# Patient Record
Sex: FEMALE | Race: ASIAN | NOT HISPANIC OR LATINO
[De-identification: names, ages, dates, MRNs, and addresses within clinical notes are randomized per-mention and may not be internally consistent; named-entity substitution may affect disease eponyms.]

---

## 2021-03-05 PROBLEM — Z00.00 ENCOUNTER FOR PREVENTIVE HEALTH EXAMINATION: Status: ACTIVE | Noted: 2021-03-05

## 2021-03-11 ENCOUNTER — APPOINTMENT (OUTPATIENT)
Dept: THORACIC SURGERY | Facility: CLINIC | Age: 66
End: 2021-03-11
Payer: MEDICARE

## 2021-03-11 VITALS
HEIGHT: 60.2 IN | HEART RATE: 95 BPM | DIASTOLIC BLOOD PRESSURE: 84 MMHG | RESPIRATION RATE: 16 BRPM | OXYGEN SATURATION: 98 % | BODY MASS INDEX: 30.42 KG/M2 | TEMPERATURE: 98 F | WEIGHT: 157 LBS | SYSTOLIC BLOOD PRESSURE: 124 MMHG

## 2021-03-11 DIAGNOSIS — Z85.3 PERSONAL HISTORY OF MALIGNANT NEOPLASM OF BREAST: ICD-10-CM

## 2021-03-11 DIAGNOSIS — Z80.0 FAMILY HISTORY OF MALIGNANT NEOPLASM OF DIGESTIVE ORGANS: ICD-10-CM

## 2021-03-11 DIAGNOSIS — Z86.79 PERSONAL HISTORY OF OTHER DISEASES OF THE CIRCULATORY SYSTEM: ICD-10-CM

## 2021-03-11 DIAGNOSIS — Z86.39 PERSONAL HISTORY OF OTHER ENDOCRINE, NUTRITIONAL AND METABOLIC DISEASE: ICD-10-CM

## 2021-03-11 DIAGNOSIS — Z85.41 PERSONAL HISTORY OF MALIGNANT NEOPLASM OF CERVIX UTERI: ICD-10-CM

## 2021-03-11 PROCEDURE — 99205 OFFICE O/P NEW HI 60 MIN: CPT

## 2021-03-12 DIAGNOSIS — Z01.818 ENCOUNTER FOR OTHER PREPROCEDURAL EXAMINATION: ICD-10-CM

## 2021-03-12 PROBLEM — Z85.41 HISTORY OF CERVICAL CARCINOMA: Status: RESOLVED | Noted: 2021-03-12 | Resolved: 2021-03-12

## 2021-03-12 PROBLEM — Z80.0 FAMILY HISTORY OF MALIGNANT NEOPLASM OF STOMACH: Status: ACTIVE | Noted: 2021-03-12

## 2021-03-12 PROBLEM — Z85.3 HISTORY OF MALIGNANT NEOPLASM OF RIGHT BREAST: Status: RESOLVED | Noted: 2021-03-12 | Resolved: 2021-03-12

## 2021-03-12 PROBLEM — Z86.79 HISTORY OF HYPERTENSION: Status: RESOLVED | Noted: 2021-03-12 | Resolved: 2021-03-12

## 2021-03-12 PROBLEM — Z86.39 HISTORY OF DIABETES MELLITUS: Status: RESOLVED | Noted: 2021-03-12 | Resolved: 2021-03-12

## 2021-03-12 RX ORDER — LACTOBACILLUS ACIDOPHILUS/PECT 30 MG-20MG
TABLET ORAL
Refills: 0 | Status: ACTIVE | COMMUNITY

## 2021-03-12 RX ORDER — SITAGLIPTIN AND METFORMIN HYDROCHLORIDE 50; 500 MG/1; MG/1
50-500 TABLET, FILM COATED ORAL
Refills: 0 | Status: ACTIVE | COMMUNITY

## 2021-03-12 RX ORDER — LISINOPRIL AND HYDROCHLOROTHIAZIDE TABLETS 10; 12.5 MG/1; MG/1
10-12.5 TABLET ORAL
Refills: 0 | Status: ACTIVE | COMMUNITY

## 2021-03-13 DIAGNOSIS — Z01.818 ENCOUNTER FOR OTHER PREPROCEDURAL EXAMINATION: ICD-10-CM

## 2021-03-14 ENCOUNTER — APPOINTMENT (OUTPATIENT)
Dept: DISASTER EMERGENCY | Facility: CLINIC | Age: 66
End: 2021-03-14

## 2021-03-15 ENCOUNTER — APPOINTMENT (OUTPATIENT)
Dept: DISASTER EMERGENCY | Facility: CLINIC | Age: 66
End: 2021-03-15

## 2021-03-15 LAB — SARS-COV-2 N GENE NPH QL NAA+PROBE: NOT DETECTED

## 2021-03-15 NOTE — DATA REVIEWED
[FreeTextEntry1] : PET/CT on 2/26/21:\par - 7 mm medial left supraclavicular node max SUV=2.2, concerning for metastatic disease. left axillary active node with normal fatty hilum max SUV=2.6\par - spiculated 8 mm medial DEBORAH solid nodule abuts the pleural surface max SUV=2.7, inflammatory/metastatic disease is suspected\par - left suprahilar percent activity corresponding to a few prominent lymph node max SUV=8.5, highly suspicious for malignancy, possibly a new primary neoplasm.\par - a few small areas of subsegmental atelectasis and/or scarring in both lungs, particularly the right lung apex.\par

## 2021-03-15 NOTE — HISTORY OF PRESENT ILLNESS
[FreeTextEntry1] : Ms. MARCELLO LOAIZA, 65 year old female, never smoker, w/ hx of cervical CA in 2010 s/p hysterectomy, Right Breast Ca in 2013, s/p induction chemotherapy, right lumpectomy, RT in 2014, HTN, DM, who f/u with her PCP Dr. Eduar Sanchez with elevated CA 2729, a PET/CT was ordered. \par \par PET/CT on 2/26/21:\par - 7 mm medial left supraclavicular node max SUV=2.2, concerning for metastatic disease. left axillary active node with normal fatty hilum max SUV=2.6\par - spiculated 8 mm medial DEBORAH solid nodule abuts the pleural surface max SUV=2.7, inflammatory/metastatic disease is suspected\par - left suprahilar percent activity corresponding to a few prominent lymph node max SUV=8.5, highly suspicious for malignancy, possibly a new primary neoplasm.\par - a few small areas of subsegmental atelectasis and/or scarring in both lungs, particularly the right lung apex.\par \par Of note, patient is s/p 2 dose of COVID-19 vaccination. 1st dose on 02/05/2021 and 2nd dose on 03/05/2021, both was given on her left arm. \par \par Patient is here today for CT Sx consultation, referred by Dr. Eduar Wesley (PCP). Patient denies shortness of breath, cough, chest pain, fever, chills, loss of appetite, weight loss, or hemoptysis.\par \par \par \par

## 2021-03-15 NOTE — CONSULT LETTER
[Consult Letter:] : I had the pleasure of evaluating your patient, [unfilled]. [( Thank you for referring [unfilled] for consultation for _____ )] : Thank you for referring [unfilled] for consultation for [unfilled] [Please see my note below.] : Please see my note below. [Consult Closing:] : Thank you very much for allowing me to participate in the care of this patient.  If you have any questions, please do not hesitate to contact me. [Sincerely,] : Sincerely, [FreeTextEntry2] : Dr. Eduar Wesley [FreeTextEntry3] : Wes Lopez MD, MPH \par System Director of Thoracic Surgery \par Director of Comprehensive Lung and Foregut [place] \par Professor Cardiovascular & Thoracic Surgery  \par [facility] [employer] [facility]\par \par [facility]\par [address]\par Oncology [address]\[place] [address]\par Tel: [phone]\par Fax: [phone]\par

## 2021-03-15 NOTE — ASSESSMENT
[FreeTextEntry1] : Ms. MARCELLO LOAIZA, 65 year old female, never smoker, w/ hx of cervical CA in 2010 s/p hysterectomy, Right Breast Ca in 2013, s/p induction chemotherapy, right lumpectomy, RT in 2014, HTN, DM, who f/u with her PCP Dr. Eduar Sanchez with elevated CA 2729, a PET/CT was ordered. \par \par PET/CT on 2/26/21:\par - 7 mm medial left supraclavicular node max SUV=2.2, concerning for metastatic disease. left axillary active node with normal fatty hilum max SUV=2.6\par - spiculated 8 mm medial DEBORAH solid nodule abuts the pleural surface max SUV=2.7, inflammatory/metastatic disease is suspected\par - left suprahilar percent activity corresponding to a few prominent lymph node max SUV=8.5, highly suspicious for malignancy, possibly a new primary neoplasm.\par - a few small areas of subsegmental atelectasis and/or scarring in both lungs, particularly the right lung apex.\par \par Of note, patient is s/p 2 dose of COVID-19 vaccination. 1st dose on 02/05/2021 and 2nd dose on 03/05/2021, both was given on her left arm. \par \par I have reviewed the patient's medical records and diagnostic images at time of this office consultation and have made the following recommendation:\par 1. PET/CT reviewed and explained to patient, I recommended a Flexible Bronchoscopy, Endobronchial ultrasound bx on 03/17/2021. Risks and benefits and alternatives explained to patient, all questions answered, patient agreed to proceed with surgery.\par 2. CT chest w/ contrast\par 3. PFTs (patient was referred to a pulmonologist by Dr. Wesley)\par 4. MRI of Brain w/w/o contrast\par 5. Medical clearance\par 6. Left supraclavicular and left axillary LN activity, possible related to recent COVID-19 vaccination, we will proceed with FB, EBUS bx first and RTC after to discuss possible US guided bx of left supraclavicular LN. \par \par I personally performed the services described in the documentation, reviewed the documentation recorded by the scribe in my presence and it accurately and completely records my words and actions.\par \par IRobyn, NP, am scribing for and the presence of SAL Hernandez, the following sections HISTORY OF PRESENT ILLNESS, PAST MEDICAL/FAMILY/SOCIAL HISTORY; REVIEW OF SYSTEMS; VITAL SIGNS; PHYSICAL EXAM; DISPOSITION.

## 2021-03-16 ENCOUNTER — OUTPATIENT (OUTPATIENT)
Dept: OUTPATIENT SERVICES | Facility: HOSPITAL | Age: 66
LOS: 1 days | End: 2021-03-16

## 2021-03-16 VITALS
DIASTOLIC BLOOD PRESSURE: 80 MMHG | TEMPERATURE: 96 F | RESPIRATION RATE: 16 BRPM | OXYGEN SATURATION: 98 % | HEIGHT: 61 IN | HEART RATE: 74 BPM | WEIGHT: 149.91 LBS | SYSTOLIC BLOOD PRESSURE: 124 MMHG

## 2021-03-16 DIAGNOSIS — I10 ESSENTIAL (PRIMARY) HYPERTENSION: ICD-10-CM

## 2021-03-16 DIAGNOSIS — R91.1 SOLITARY PULMONARY NODULE: ICD-10-CM

## 2021-03-16 DIAGNOSIS — E11.9 TYPE 2 DIABETES MELLITUS WITHOUT COMPLICATIONS: ICD-10-CM

## 2021-03-16 DIAGNOSIS — Z98.890 OTHER SPECIFIED POSTPROCEDURAL STATES: Chronic | ICD-10-CM

## 2021-03-16 DIAGNOSIS — Z90.710 ACQUIRED ABSENCE OF BOTH CERVIX AND UTERUS: Chronic | ICD-10-CM

## 2021-03-16 RX ORDER — SODIUM CHLORIDE 9 MG/ML
1000 INJECTION, SOLUTION INTRAVENOUS
Refills: 0 | Status: DISCONTINUED | OUTPATIENT
Start: 2021-03-17 | End: 2021-03-31

## 2021-03-16 RX ORDER — MILK THISTLE 180 MG
1 CAPSULE ORAL
Qty: 0 | Refills: 0 | DISCHARGE

## 2021-03-16 NOTE — H&P PST ADULT - HISTORY OF PRESENT ILLNESS
Pt is a 65 yr old female scheduled for flexible Bronchoscopy Endobronchial U/S Biopsy with Cytology with Dr Lopez 3/17/21 - Pt hx cervical and breast ca and   Pt denies COVID or recent travel   Pt has received  Pt is a 65 yr old female scheduled for flexible Bronchoscopy Endobronchial U/S Biopsy with Cytology with Dr Lopez 3/17/21 - Pt hx cervical ca 2010 with hysterectomy ,  and  right breast ca  with lumpectomy , RT and chemo 2014 , now  noted to have abnormal CA levels and PET showed enlarged lymph node and pulmonary nodule. Pt c/o of mild occasional cough in afternoon but denies SOB or hemoptysis - Hx HTN and DM   Pt denies COVID or recent travel   Pt has received 2 doses COVID vaccine and had COVID preop test Sunday

## 2021-03-16 NOTE — H&P PST ADULT - NSICDXPASTMEDICALHX_GEN_ALL_CORE_FT
PAST MEDICAL HISTORY:  Breast CA right    Cervical ca     DM (diabetes mellitus)     HTN (hypertension)     Pulmonary nodule      PAST MEDICAL HISTORY:  Breast CA right    Cervical ca     DM (diabetes mellitus)     HLD (hyperlipidemia)     HTN (hypertension)     Pulmonary nodule

## 2021-03-16 NOTE — H&P PST ADULT - NSSUBSTANCEUSE_GEN_ALL_CORE_SD
I discussed the findings, assessment and plan with the resident and agree with the resident's findings and plan as documented in the resident's note. caffeine

## 2021-03-16 NOTE — H&P PST ADULT - NSICDXPROBLEM_GEN_ALL_CORE_FT
PROBLEM DIAGNOSES  Problem: Pulmonary nodule  Assessment and Plan: Pt scheduled for surgery and preop instructions including instructions for taking Famotidine on the day of surgery, given verbally and with use of  written materials, and patient confirming understanding of such instructions using  teach back method.  OR Booking notified of DM, jordyn precautions and NO BP/IV right arm   MC in chart - Chart reviewed for surgery by Medical director   Pt last dose of ASA 3/15/21     Problem: DM (diabetes mellitus)  Assessment and Plan: Pt to not take Janumet this evening     Problem: HTN (hypertension)  Assessment and Plan: Pt to take Lisinopril am DOS

## 2021-03-16 NOTE — ASU PATIENT PROFILE, ADULT - PMH
Breast CA  right  Cervical ca    DM (diabetes mellitus)    HLD (hyperlipidemia)    HTN (hypertension)    Pulmonary nodule

## 2021-03-16 NOTE — H&P PST ADULT - NSICDXPASTSURGICALHX_GEN_ALL_CORE_FT
PAST SURGICAL HISTORY:  H/O breast surgery right lumpectomy - NO IV/BP right arm    H/O: hysterectomy

## 2021-03-16 NOTE — H&P PST ADULT - NEGATIVE FEMALE-SPECIFIC SYMPTOMS
Pt sent over from immediate care stating she has been having sob. Pt put on cardiac monitor with pulse ox monitoring. no abnormal vaginal bleeding/no pelvic pain

## 2021-03-17 ENCOUNTER — RESULT REVIEW (OUTPATIENT)
Age: 66
End: 2021-03-17

## 2021-03-17 ENCOUNTER — APPOINTMENT (OUTPATIENT)
Dept: THORACIC SURGERY | Facility: HOSPITAL | Age: 66
End: 2021-03-17

## 2021-03-17 ENCOUNTER — OUTPATIENT (OUTPATIENT)
Dept: OUTPATIENT SERVICES | Facility: HOSPITAL | Age: 66
LOS: 1 days | Discharge: HOME | End: 2021-03-17
Payer: MEDICARE

## 2021-03-17 VITALS
SYSTOLIC BLOOD PRESSURE: 122 MMHG | HEIGHT: 61 IN | HEART RATE: 94 BPM | WEIGHT: 151.9 LBS | OXYGEN SATURATION: 97 % | TEMPERATURE: 99 F | DIASTOLIC BLOOD PRESSURE: 69 MMHG | RESPIRATION RATE: 14 BRPM

## 2021-03-17 VITALS
RESPIRATION RATE: 16 BRPM | HEART RATE: 84 BPM | OXYGEN SATURATION: 96 % | DIASTOLIC BLOOD PRESSURE: 64 MMHG | SYSTOLIC BLOOD PRESSURE: 113 MMHG | TEMPERATURE: 98 F

## 2021-03-17 DIAGNOSIS — Z98.890 OTHER SPECIFIED POSTPROCEDURAL STATES: Chronic | ICD-10-CM

## 2021-03-17 DIAGNOSIS — R91.1 SOLITARY PULMONARY NODULE: ICD-10-CM

## 2021-03-17 DIAGNOSIS — Z90.710 ACQUIRED ABSENCE OF BOTH CERVIX AND UTERUS: Chronic | ICD-10-CM

## 2021-03-17 PROCEDURE — 31645 BRNCHSC W/THER ASPIR 1ST: CPT

## 2021-03-17 PROCEDURE — 88342 IMHCHEM/IMCYTCHM 1ST ANTB: CPT | Mod: 26,59

## 2021-03-17 PROCEDURE — 71045 X-RAY EXAM CHEST 1 VIEW: CPT | Mod: 26

## 2021-03-17 PROCEDURE — 88112 CYTOPATH CELL ENHANCE TECH: CPT | Mod: 26,59

## 2021-03-17 PROCEDURE — 31624 DX BRONCHOSCOPE/LAVAGE: CPT

## 2021-03-17 PROCEDURE — 88173 CYTOPATH EVAL FNA REPORT: CPT | Mod: 26

## 2021-03-17 PROCEDURE — 88360 TUMOR IMMUNOHISTOCHEM/MANUAL: CPT | Mod: 26

## 2021-03-17 PROCEDURE — 88341 IMHCHEM/IMCYTCHM EA ADD ANTB: CPT | Mod: 26,59

## 2021-03-17 PROCEDURE — 31652 BRONCH EBUS SAMPLNG 1/2 NODE: CPT

## 2021-03-17 PROCEDURE — 31629 BRONCHOSCOPY/NEEDLE BX EACH: CPT

## 2021-03-17 PROCEDURE — 88305 TISSUE EXAM BY PATHOLOGIST: CPT | Mod: 26

## 2021-03-17 PROCEDURE — 31623 DX BRONCHOSCOPE/BRUSH: CPT

## 2021-03-17 PROCEDURE — 31625 BRONCHOSCOPY W/BIOPSY(S): CPT | Mod: 59

## 2021-03-17 NOTE — BRIEF OPERATIVE NOTE - NSICDXBRIEFPROCEDURE_GEN_ALL_CORE_FT
PROCEDURES:  Bronchoscopy, intraoperative, with EBUS and biopsy 17-Mar-2021 15:48:21 Endobronchial Brushing & Endobronchial Biopsy Gurpreet Gomez

## 2021-03-17 NOTE — BRIEF OPERATIVE NOTE - COMMENTS
GRAYSON Gomez PA-C provided direct first assist support to the surgeon during this surgical procedure. My involvement included positioning, prepping and draping the patient prior to surgery, ensuring clear visibility and exposure for the surgeon by using instruments such as retractors and suction, closing surgical incisions and dressing wounds. As well as other tasks as directed by the surgeon.

## 2021-03-17 NOTE — ASU DISCHARGE PLAN (ADULT/PEDIATRIC) - CARE PROVIDER_API CALL
Wes Lopez (MD)  Surgery; Thoracic Surgery  [address] [place] [address]  Phone: [phone]  Fax: [phone]  Follow Up Time: 2 weeks

## 2021-03-18 LAB — GLUCOSE BLDC GLUCOMTR-MCNC: 125 MG/DL — HIGH (ref 70–99)

## 2021-03-22 PROBLEM — R91.1 SOLITARY PULMONARY NODULE: Chronic | Status: ACTIVE | Noted: 2021-03-16

## 2021-03-22 PROBLEM — E11.9 TYPE 2 DIABETES MELLITUS WITHOUT COMPLICATIONS: Chronic | Status: ACTIVE | Noted: 2021-03-16

## 2021-03-22 PROBLEM — C50.919 MALIGNANT NEOPLASM OF UNSPECIFIED SITE OF UNSPECIFIED FEMALE BREAST: Chronic | Status: ACTIVE | Noted: 2021-03-16

## 2021-03-22 PROBLEM — C53.9 MALIGNANT NEOPLASM OF CERVIX UTERI, UNSPECIFIED: Chronic | Status: ACTIVE | Noted: 2021-03-16

## 2021-03-22 PROBLEM — E78.5 HYPERLIPIDEMIA, UNSPECIFIED: Chronic | Status: ACTIVE | Noted: 2021-03-16

## 2021-03-22 PROBLEM — I10 ESSENTIAL (PRIMARY) HYPERTENSION: Chronic | Status: ACTIVE | Noted: 2021-03-16

## 2021-03-24 LAB
NON-GYNECOLOGICAL CYTOLOGY STUDY: SIGNIFICANT CHANGE UP
NON-GYNECOLOGICAL CYTOLOGY STUDY: SIGNIFICANT CHANGE UP

## 2021-03-25 ENCOUNTER — APPOINTMENT (OUTPATIENT)
Dept: THORACIC SURGERY | Facility: CLINIC | Age: 66
End: 2021-03-25
Payer: MEDICARE

## 2021-03-25 VITALS
OXYGEN SATURATION: 99 % | SYSTOLIC BLOOD PRESSURE: 131 MMHG | RESPIRATION RATE: 17 BRPM | BODY MASS INDEX: 29.49 KG/M2 | DIASTOLIC BLOOD PRESSURE: 85 MMHG | TEMPERATURE: 98 F | HEART RATE: 94 BPM | WEIGHT: 152 LBS

## 2021-03-25 PROCEDURE — 99214 OFFICE O/P EST MOD 30 MIN: CPT

## 2021-03-29 NOTE — ASSESSMENT
[FreeTextEntry1] : Ms. MARCELLO LOAIZA, 65 year old female, never smoker, w/ hx of cervical CA in 2010 s/p hysterectomy, Right Breast Ca in 2013, s/p induction chemotherapy, right lumpectomy, RT in 2014, HTN, DM, who f/u with her PCP Dr. Eduar Sanchez with elevated CA 2729, a PET/CT was ordered. \par \par PET/CT on 2/26/21:\par - 7 mm medial left supraclavicular node max SUV=2.2, concerning for metastatic disease. left axillary active node with normal fatty hilum max SUV=2.6\par - spiculated 8 mm medial DEBORAH solid nodule abuts the pleural surface max SUV=2.7, inflammatory/metastatic disease is suspected\par - left suprahilar percent activity corresponding to a few prominent lymph node max SUV=8.5, highly suspicious for malignancy, possibly a new primary neoplasm.\par - a few small areas of subsegmental atelectasis and/or scarring in both lungs, particularly the right lung apex.\par \par Of note, patient is s/p 2 dose of COVID-19 vaccination. 1st dose on 02/05/2021 and 2nd dose on 03/05/2021, both was given on her left arm. \par \par CT Chest w/ contrast on 3/13/21:\par - radiation fibrosis involving the RUL\par - stable 8mm subpleural nodule in the DEBORAH (3:33)\par - 1cm Lt hilar LN (2:42)\par \par Brain MRI on 3/13/21: MARU.\par \par PFTs on 3/15/21: FVC 94%, FEV1 96%, DLCO 99%.\par \par Now s/p Flex Bronch bx and brushing of DEBORAH endobronchial lesion, FB w/ BAL of DEBORAH bronchus, EBUS bx of Lt hilar LNs on 3/17/21. Path of DEBORAH endobronchial bx and brushing revealed +malignancy, c/w poorly-diff NSCLC, +CAM5.2 and CK7. DEBORAH BAL +malignancy, NSCLC. Lt hilar LN negative for malignancy. \par \par \par I have reviewed the patient's medical records and diagnostic images at time of this office consultation and have made the following recommendation:\par 1. Pathology report reviewed with pt today, the left supraclavicular lymphadenopathy is suspicious for metastatic disease. Recommend CT/U.S guided biopsy of left supraclavicular LN with IR. \par 2. Repeat CT chest w/contrast in 3 months. \par 3. Awaiting for molecular test results. \par 4. RTC after biopsy done. \par \par \par \par I personally performed the services described in the documentation, reviewed the documentation recorded by the scribe in my presence and it accurately and completely records my words and actions.\par \par I, Roxie Brandon, YANCY, am scribing for and the presence of SAL Hernandez, the following sections HISTORY OF PRESENT ILLNESS, PAST MEDICAL/FAMILY/SOCIAL HISTORY; REVIEW OF SYSTEMS; VITAL SIGNS; PHYSICAL EXAM; DISPOSITION.\par \par

## 2021-03-29 NOTE — PHYSICAL EXAM
[Sclera] : the sclera and conjunctiva were normal [PERRL With Normal Accommodation] : pupils were equal in size, round, and reactive to light [Neck Appearance] : the appearance of the neck was normal [Neck Cervical Mass (___cm)] : no neck mass was observed [Respiration, Rhythm And Depth] : normal respiratory rhythm and effort [Auscultation Breath Sounds / Voice Sounds] : lungs were clear to auscultation bilaterally [Heart Rate And Rhythm] : heart rate was normal and rhythm regular [Heart Sounds] : normal S1 and S2 [Examination Of The Chest] : the chest was normal in appearance [Chest Visual Inspection Thoracic Asymmetry] : no chest asymmetry [2+] : left 2+ [No Abnormalities] : the abdominal aorta was not enlarged and no bruit was heard [No Pulse Delay] : no pulse delay [Bowel Sounds] : normal bowel sounds [Abdomen Soft] : soft [Abdomen Tenderness] : non-tender [Cervical Lymph Nodes Enlarged Posterior Bilaterally] : posterior cervical [Cervical Lymph Nodes Enlarged Anterior Bilaterally] : anterior cervical [No CVA Tenderness] : no ~M costovertebral angle tenderness [Abnormal Walk] : normal gait [Involuntary Movements] : no involuntary movements were seen [Musculoskeletal - Swelling] : no joint swelling seen [Skin Color & Pigmentation] : normal skin color and pigmentation [Skin Turgor] : normal skin turgor [] : no rash [No Focal Deficits] : no focal deficits [Oriented To Time, Place, And Person] : oriented to person, place, and time [Affect] : the affect was normal [Mood] : the mood was normal [Fingers] :  capillary refill of the fingers was normal [Varicose Veins Of The Right Leg] : the patient has no varicose veins of the right leg [Varicose Veins Of The Left Leg] : the patient has no varicose veins of the left leg

## 2021-03-29 NOTE — CONSULT LETTER
[Dear  ___] : Dear  [unfilled], [Consult Letter:] : I had the pleasure of evaluating your patient, [unfilled]. [( Thank you for referring [unfilled] for consultation for _____ )] : Thank you for referring [unfilled] for consultation for [unfilled] [Please see my note below.] : Please see my note below. [Consult Closing:] : Thank you very much for allowing me to participate in the care of this patient.  If you have any questions, please do not hesitate to contact me. [Sincerely,] : Sincerely, [FreeTextEntry2] : Dr. Eduar Wesley  (Hem/Onc/Referring) [FreeTextEntry3] : Wes Lopez MD, MPH \par System Director of Thoracic Surgery \par Director of Comprehensive Lung and Foregut [place] \par Professor Cardiovascular & Thoracic Surgery  \par [facility]\par \par [facility]\par [address]\par Oncology [address]\[place] [address]\par Tel: [phone]\par Fax: [phone]\par

## 2021-03-29 NOTE — DATA REVIEWED
[FreeTextEntry1] : CT Chest w/ contrast on 3/13/21:\par - radiation fibrosis involving the RUL\par - stable 8mm subpleural nodule in the DEBORAH (3:33)\par - 1cm Lt hilar LN (2:42)\par \par Brain MRI on 3/13/21: MAUR.\par \par PFTs on 3/15/21: FVC 94%, FEV1 96%, DLCO 99%.\par \par Now s/p Flex Bronch bx and brushing of DEBORAH endobronchial lesion, FB w/ BAL of DEBORAH bronchus, EBUS bx of Lt hilar LNs on 3/17/21. Path of DEBORAH endobronchial bx and brushing revealed +malignancy, c/w poorly-diff NSCLC, +CAM5.2 and CK7. DEBORAH BAL +malignancy, NSCLC. Lt hilar LN negative for malignancy.

## 2021-03-29 NOTE — REASON FOR VISIT
[Follow-Up: _____] : a [unfilled] follow-up visit [[employer] ] : provided by [employer]   [FreeTextEntry1] : [id number]

## 2021-03-29 NOTE — HISTORY OF PRESENT ILLNESS
[FreeTextEntry1] : Ms. MARCELLO LOAIZA, 65 year old female, never smoker, w/ hx of cervical CA in 2010 s/p hysterectomy, Right Breast Ca in 2013, s/p induction chemotherapy, right lumpectomy, RT in 2014, HTN, DM, who f/u with her PCP Dr. Eduar Sanchez with elevated CA 2729, a PET/CT was ordered. \par \par PET/CT on 2/26/21:\par - 7 mm medial left supraclavicular node max SUV=2.2, concerning for metastatic disease. left axillary active node with normal fatty hilum max SUV=2.6\par - spiculated 8 mm medial DEBORAH solid nodule abuts the pleural surface max SUV=2.7, inflammatory/metastatic disease is suspected\par - left suprahilar percent activity corresponding to a few prominent lymph node max SUV=8.5, highly suspicious for malignancy, possibly a new primary neoplasm.\par - a few small areas of subsegmental atelectasis and/or scarring in both lungs, particularly the right lung apex.\par \par Of note, patient is s/p 2 dose of COVID-19 vaccination. 1st dose on 02/05/2021 and 2nd dose on 03/05/2021, both was given on her left arm. \par \par CT Chest w/ contrast on 3/13/21:\par - radiation fibrosis involving the RUL\par - stable 8mm subpleural nodule in the DEBORAH (3:33)\par - 1cm Lt hilar LN (2:42)\par \par Brain MRI on 3/13/21: MARU.\par \par PFTs on 3/15/21: FVC 94%, FEV1 96%, DLCO 99%.\par \par Now s/p Flex Bronch bx and brushing of DEBORAH endobronchial lesion, FB w/ BAL of DEBORAH bronchus, EBUS bx of Lt hilar LNs on 3/17/21. Path of DEBORAH endobronchial bx and brushing revealed +malignancy, c/w poorly-diff NSCLC, +CAM5.2 and CK7. DEBORAH BAL +malignancy, NSCLC. Lt hilar LN negative for malignancy. \par \par Patient is here today for a follow up. The patient denies SOB, chest pain, hemoptysis, palpitation, fever, recent illness, significant weight loss. She admits cough on and off. \par \par

## 2021-04-14 ENCOUNTER — APPOINTMENT (OUTPATIENT)
Dept: INTERVENTIONAL RADIOLOGY/VASCULAR | Facility: CLINIC | Age: 66
End: 2021-04-14
Payer: MEDICARE

## 2021-04-28 ENCOUNTER — APPOINTMENT (OUTPATIENT)
Dept: INTERVENTIONAL RADIOLOGY/VASCULAR | Facility: CLINIC | Age: 66
End: 2021-04-28
Payer: MEDICARE

## 2021-04-28 VITALS — HEIGHT: 61 IN | WEIGHT: 154 LBS | BODY MASS INDEX: 29.07 KG/M2

## 2021-04-28 PROCEDURE — 99204 OFFICE O/P NEW MOD 45 MIN: CPT | Mod: 95

## 2021-04-28 RX ORDER — ASPIRIN 81 MG
81 TABLET, DELAYED RELEASE (ENTERIC COATED) ORAL
Refills: 0 | Status: ACTIVE | COMMUNITY

## 2021-05-11 ENCOUNTER — LABORATORY RESULT (OUTPATIENT)
Age: 66
End: 2021-05-11

## 2021-05-14 ENCOUNTER — RESULT REVIEW (OUTPATIENT)
Age: 66
End: 2021-05-14

## 2021-05-14 ENCOUNTER — OUTPATIENT (OUTPATIENT)
Dept: OUTPATIENT SERVICES | Facility: HOSPITAL | Age: 66
LOS: 1 days | End: 2021-05-14
Payer: MEDICARE

## 2021-05-14 DIAGNOSIS — Z98.890 OTHER SPECIFIED POSTPROCEDURAL STATES: Chronic | ICD-10-CM

## 2021-05-14 DIAGNOSIS — R59.9 ENLARGED LYMPH NODES, UNSPECIFIED: ICD-10-CM

## 2021-05-14 DIAGNOSIS — Z90.710 ACQUIRED ABSENCE OF BOTH CERVIX AND UTERUS: Chronic | ICD-10-CM

## 2021-05-14 PROCEDURE — 88305 TISSUE EXAM BY PATHOLOGIST: CPT | Mod: 26

## 2021-05-14 PROCEDURE — 38505 NEEDLE BIOPSY LYMPH NODES: CPT

## 2021-05-14 PROCEDURE — 88173 CYTOPATH EVAL FNA REPORT: CPT | Mod: 26

## 2021-05-14 PROCEDURE — 76942 ECHO GUIDE FOR BIOPSY: CPT | Mod: 26

## 2021-05-17 LAB — NON-GYNECOLOGICAL CYTOLOGY STUDY: SIGNIFICANT CHANGE UP

## 2021-05-20 ENCOUNTER — APPOINTMENT (OUTPATIENT)
Dept: THORACIC SURGERY | Facility: CLINIC | Age: 66
End: 2021-05-20
Payer: MEDICARE

## 2021-05-20 VITALS
DIASTOLIC BLOOD PRESSURE: 80 MMHG | RESPIRATION RATE: 17 BRPM | TEMPERATURE: 98 F | SYSTOLIC BLOOD PRESSURE: 126 MMHG | BODY MASS INDEX: 29.85 KG/M2 | HEART RATE: 105 BPM | WEIGHT: 158 LBS | OXYGEN SATURATION: 98 %

## 2021-05-20 PROCEDURE — 99214 OFFICE O/P EST MOD 30 MIN: CPT

## 2021-05-21 NOTE — ASSESSMENT
[FreeTextEntry1] : Ms. MARCELLO OLAIZA, 65 year old female, never smoker, w/ hx of cervical CA in 2010 s/p hysterectomy, Right Breast CA in 2013, s/p induction chemotherapy, right lumpectomy, RT in 2014, HTN, DM, who f/u with her PCP Dr. Eduar Sanchez with elevated CA 2729, a PET/CT was ordered. \par \par PET/CT on 2/26/21:\par - 7 mm medial left supraclavicular node max SUV=2.2, concerning for metastatic disease. left axillary active node with normal fatty hilum max SUV=2.6\par - spiculated 8 mm medial DEBORAH solid nodule abuts the pleural surface max SUV=2.7, inflammatory/metastatic disease is suspected\par - left suprahilar percent activity corresponding to a few prominent lymph node max SUV=8.5, highly suspicious for malignancy, possibly a new primary neoplasm.\par - a few small areas of subsegmental atelectasis and/or scarring in both lungs, particularly the right lung apex.\par \par Of note, patient is s/p 2 dose of COVID-19 vaccination. 1st dose on 02/05/2021 and 2nd dose on 03/05/2021, both was given on her left arm. \par \par CT Chest w/ contrast on 3/13/21:\par - radiation fibrosis involving the RUL\par - stable 8mm subpleural nodule in the DEBORAH (3:33)\par - 1cm Lt hilar LN (2:42)\par \par Brain MRI on 3/13/21: MARU.\par \par PFTs on 3/15/21: FVC 94%, FEV1 96%, DLCO 99%.\par \par Now s/p Flex Bronch bx and brushing of DEBORAH endobronchial lesion, FB w/ BAL of DEBORAH bronchus, EBUS bx of Lt hilar LNs on 3/17/21. Path of DEBORAH endobronchial bx and brushing revealed +malignancy, c/w poorly-diff NSCLC, +CAM5.2 and CK7. DEBORAH BAL +malignancy, NSCLC. Lt hilar LN negative for malignancy. \par \par U/S-guided FNA of Lt supraclavicular LN on 5/14/21, path negative for malignancy.\par \par I have reviewed the patient's medical records and diagnostic images at time of this office consultation and have made the following recommendation:\par 1. Path reviewed and explained to patient, negative malignancy, but +PET DEBORAH nodule remains suspicious.\par 2. I recommended a CT Chest w/ contrast and PET/CT scan to re-evaluate.\par 3. RTC after CT and PET scans to discuss possible surgery: Lt VATS Robotic-assisted, DEBORAH wedge rxn, possible segmentectomy, possible lobectomy, MLND.\par 4. WIll also discuss her case at [facility] Tumor Board.\par \par \par I personally performed the services described in the documentation, reviewed the documentation recorded by the scribe in my presence and it accurately and completely records my words and actions.\par \par I, Mack Wheat NP, am scribing for and the presence of SAL Hernandez, the following sections HISTORY OF PRESENT ILLNESS, PAST MEDICAL/FAMILY/SOCIAL HISTORY; REVIEW OF SYSTEMS; VITAL SIGNS; PHYSICAL EXAM; DISPOSITION.\par \par

## 2021-05-21 NOTE — DATA REVIEWED
[FreeTextEntry1] : I have independently reviewed patient's path:\par U/S-guided FNA of Lt supraclavicular LN on 5/14/21, path negative for malignancy.

## 2021-05-21 NOTE — HISTORY OF PRESENT ILLNESS
[FreeTextEntry1] : Ms. MARCELLO LOAIZA, 65 year old female, never smoker, w/ hx of cervical CA in 2010 s/p hysterectomy, Right Breast CA in 2013, s/p induction chemotherapy, right lumpectomy, RT in 2014, HTN, DM, who f/u with her PCP Dr. Eduar Sanchez with elevated CA 2729, a PET/CT was ordered. \par \par PET/CT on 2/26/21:\par - 7 mm medial left supraclavicular node max SUV=2.2, concerning for metastatic disease. left axillary active node with normal fatty hilum max SUV=2.6\par - spiculated 8 mm medial DEBORAH solid nodule abuts the pleural surface max SUV=2.7, inflammatory/metastatic disease is suspected\par - left suprahilar percent activity corresponding to a few prominent lymph node max SUV=8.5, highly suspicious for malignancy, possibly a new primary neoplasm.\par - a few small areas of subsegmental atelectasis and/or scarring in both lungs, particularly the right lung apex.\par \par Of note, patient is s/p 2 dose of COVID-19 vaccination. 1st dose on 02/05/2021 and 2nd dose on 03/05/2021, both was given on her left arm. \par \par CT Chest w/ contrast on 3/13/21:\par - radiation fibrosis involving the RUL\par - stable 8mm subpleural nodule in the DEBORAH (3:33)\par - 1cm Lt hilar LN (2:42)\par \par Brain MRI on 3/13/21: MARU.\par \par PFTs on 3/15/21: FVC 94%, FEV1 96%, DLCO 99%.\par \par Now s/p Flex Bronch bx and brushing of DEBORAH endobronchial lesion, FB w/ BAL of DEBORAH bronchus, EBUS bx of Lt hilar LNs on 3/17/21. Path of DEBORAH endobronchial bx and brushing revealed +malignancy, c/w poorly-diff NSCLC, +CAM5.2 and CK7. DEBORAH BAL +malignancy, NSCLC. Lt hilar LN negative for malignancy. \par \par U/S-guided FNA of Lt supraclavicular LN on 5/14/21, path negative for malignancy.\par \par Patient is here today for a follow up. Denies SOB, CP, cough.\par

## 2021-05-21 NOTE — CONSULT LETTER
[Dear  ___] : Dear  [unfilled], [Consult Letter:] : I had the pleasure of evaluating your patient, [unfilled]. [( Thank you for referring [unfilled] for consultation for _____ )] : Thank you for referring [unfilled] for consultation for [unfilled] [Please see my note below.] : Please see my note below. [Consult Closing:] : Thank you very much for allowing me to participate in the care of this patient.  If you have any questions, please do not hesitate to contact me. [Sincerely,] : Sincerely, [FreeTextEntry2] : Dr. Eduar Wesley (Hem/Onc/Referring) \par  [FreeTextEntry3] : Wes Lopez MD, MPH \par System Director of Thoracic Surgery \par Director of Comprehensive Lung and Foregut [place] \par Professor Cardiovascular & Thoracic Surgery  \par [facility]\par \par [facility]\par [address]\par Oncology [address]\[place] [address]\par Tel: [phone]\par Fax: [phone]\par

## 2021-05-24 DIAGNOSIS — R59.0 LOCALIZED ENLARGED LYMPH NODES: ICD-10-CM

## 2021-05-25 ENCOUNTER — TRANSCRIPTION ENCOUNTER (OUTPATIENT)
Age: 66
End: 2021-05-25

## 2021-06-09 ENCOUNTER — NON-APPOINTMENT (OUTPATIENT)
Age: 66
End: 2021-06-09

## 2021-06-10 ENCOUNTER — APPOINTMENT (OUTPATIENT)
Dept: THORACIC SURGERY | Facility: CLINIC | Age: 66
End: 2021-06-10

## 2021-08-16 PROBLEM — R59.1 LYMPHADENOPATHY: Status: ACTIVE | Noted: 2021-03-09

## 2021-08-19 ENCOUNTER — APPOINTMENT (OUTPATIENT)
Dept: THORACIC SURGERY | Facility: CLINIC | Age: 66
End: 2021-08-19
Payer: MEDICARE

## 2021-08-19 VITALS
RESPIRATION RATE: 17 BRPM | HEART RATE: 88 BPM | BODY MASS INDEX: 29.85 KG/M2 | DIASTOLIC BLOOD PRESSURE: 82 MMHG | OXYGEN SATURATION: 97 % | SYSTOLIC BLOOD PRESSURE: 122 MMHG | TEMPERATURE: 97.6 F | WEIGHT: 158 LBS

## 2021-08-19 DIAGNOSIS — R91.1 SOLITARY PULMONARY NODULE: ICD-10-CM

## 2021-08-19 DIAGNOSIS — R59.1 GENERALIZED ENLARGED LYMPH NODES: ICD-10-CM

## 2021-08-19 PROCEDURE — 99215 OFFICE O/P EST HI 40 MIN: CPT

## 2021-08-22 PROBLEM — R91.1 LUNG NODULE: Status: ACTIVE | Noted: 2021-03-09

## 2021-08-22 RX ORDER — GRANISETRON 3.1 MG/24H
3.1 PATCH TRANSDERMAL
Qty: 1 | Refills: 0 | Status: ACTIVE | COMMUNITY
Start: 2021-07-01

## 2021-08-22 RX ORDER — APREPITANT 125MG-80MG
80 & 125 KIT ORAL
Qty: 3 | Refills: 0 | Status: ACTIVE | COMMUNITY
Start: 2021-07-01

## 2021-08-22 RX ORDER — EXEMESTANE 25 MG/1
25 TABLET, FILM COATED ORAL
Qty: 30 | Refills: 0 | Status: ACTIVE | COMMUNITY
Start: 2021-08-05

## 2021-08-22 RX ORDER — DEXAMETHASONE SODIUM PHOSPHATE 4 MG/ML
20 INJECTION, SOLUTION INTRA-ARTICULAR; INTRALESIONAL; INTRAMUSCULAR; INTRAVENOUS; SOFT TISSUE
Qty: 15 | Refills: 0 | Status: ACTIVE | COMMUNITY
Start: 2021-07-01

## 2021-08-22 RX ORDER — CARBOPLATIN 10 MG/ML
150 INJECTION, SOLUTION INTRAVENOUS
Qty: 45 | Refills: 0 | Status: ACTIVE | COMMUNITY
Start: 2021-07-01

## 2021-08-22 RX ORDER — DIPHENHYDRAMINE HYDROCHLORIDE 50 MG/ML
50 INJECTION, SOLUTION INTRAMUSCULAR; INTRAVENOUS
Qty: 3 | Refills: 0 | Status: ACTIVE | COMMUNITY
Start: 2021-07-01

## 2021-08-22 RX ORDER — ONDANSETRON 4 MG/1
4 TABLET ORAL
Qty: 20 | Refills: 0 | Status: ACTIVE | COMMUNITY
Start: 2021-07-01

## 2021-08-22 RX ORDER — PACLITAXEL 6 MG/ML
100 INJECTION, SOLUTION INTRAVENOUS
Qty: 50 | Refills: 0 | Status: ACTIVE | COMMUNITY
Start: 2021-07-01

## 2021-08-23 NOTE — CONSULT LETTER
[FreeTextEntry2] : Dr. Eduar Wesley (Hem/Onc/Referring) \par  [FreeTextEntry3] : Wes Lopez MD, MPH \par System Director of Thoracic Surgery \par Director of Comprehensive Lung and Foregut [place] \par Professor Cardiovascular & Thoracic Surgery  \par [facility] at [facility]\par \par [facility]\par [address]\par Oncology [address]\[place] [address]\par Tel: [phone]\par Fax: [phone]\par

## 2021-08-23 NOTE — PHYSICAL EXAM
[Sclera] : the sclera and conjunctiva were normal [PERRL With Normal Accommodation] : pupils were equal in size, round, and reactive to light [Neck Appearance] : the appearance of the neck was normal [Neck Cervical Mass (___cm)] : no neck mass was observed [Respiration, Rhythm And Depth] : normal respiratory rhythm and effort [Auscultation Breath Sounds / Voice Sounds] : lungs were clear to auscultation bilaterally [Heart Rate And Rhythm] : heart rate was normal and rhythm regular [Heart Sounds] : normal S1 and S2 [Examination Of The Chest] : the chest was normal in appearance [2+] : right 2+ [No Abnormalities] : the abdominal aorta was not enlarged and no bruit was heard [No Pulse Delay] : no pulse delay [Bowel Sounds] : normal bowel sounds [Abdomen Soft] : soft [Abdomen Tenderness] : non-tender [Cervical Lymph Nodes Enlarged Posterior Bilaterally] : posterior cervical [No CVA Tenderness] : no ~M costovertebral angle tenderness [Supraclavicular Lymph Nodes Enlarged Bilaterally] : supraclavicular [Abnormal Walk] : normal gait [Involuntary Movements] : no involuntary movements were seen [Skin Color & Pigmentation] : normal skin color and pigmentation [Skin Turgor] : normal skin turgor [] : no rash [Sensation] : the sensory exam was normal to light touch and pinprick [No Focal Deficits] : no focal deficits [Oriented To Time, Place, And Person] : oriented to person, place, and time [Affect] : the affect was normal [Mood] : the mood was normal [Fingers] :  capillary refill of the fingers was normal

## 2021-08-23 NOTE — ASSESSMENT
[FreeTextEntry1] :  66 year old female, never smoker, w/ hx of cervical CA in 2010 s/p hysterectomy, Right Breast CA in 2013, s/p induction chemotherapy, right lumpectomy, RT in 2014, HTN, DM, who f/u with her PCP Dr. Eduar Sanchez with elevated CA 2729, a PET/CT was ordered. \par \par PET/CT on 2/26/21:\par - 7 mm medial left supraclavicular node max SUV=2.2, concerning for metastatic disease. left axillary active node with normal fatty hilum max SUV=2.6\par - spiculated 8 mm medial DEBORAH solid nodule abuts the pleural surface max SUV=2.7, inflammatory/metastatic disease is suspected\par - left suprahilar percent activity corresponding to a few prominent lymph node max SUV=8.5, highly suspicious for malignancy, possibly a new primary neoplasm.\par - a few small areas of subsegmental atelectasis and/or scarring in both lungs, particularly the right lung apex.\par \par Of note, patient is s/p 2 dose of COVID-19 vaccination. 1st dose on 02/05/2021 and 2nd dose on 03/05/2021, both was given on her left arm. \par \par CT Chest w/ contrast on 3/13/21:\par - radiation fibrosis involving the RUL\par - stable 8mm subpleural nodule in the DEBORAH (3:33)\par - 1cm Lt hilar LN (2:42)\par \par Brain MRI on 3/13/21: MARU.\par \par PFTs on 3/15/21: FVC 94%, FEV1 96%, DLCO 99%.\par \par s/p Flex Bronch bx and brushing of DEBORAH endobronchial lesion, FB w/ BAL of DEBORAH bronchus, EBUS bx of Lt hilar LNs on 3/17/21. Path of DEBORAH endobronchial bx and brushing revealed +malignancy, c/w poorly-diff NSCLC, +CAM5.2 and CK7. DEBORAH BAL +malignancy, NSCLC. Lt hilar LN negative for malignancy. \par \par U/S-guided FNA of Lt supraclavicular LN on 5/14/21, path negative for malignancy.\par \par CT Chest w/ contrast on 7/14/21:\par - post-radiation changes\par - stable 8 mm DEBORAH medial subpleural nodule (3:42)\par - interval decrease in size of Lt hilar LN, now 4 mm, previously 1cm\par \par PET/CT on 8/12/21:\par - Rt aspect of liver SUV=3.7\par - decreased in size 6 mm DEBORAH nodule (previously 8 mm with SUV=2.4)\par - stable few Lt pleural based nodules, up to 4 mm\par - improved Lt suprahilar region SUV=4.2, previously SUV=5.8\par \par She completed neoadjuvant chemo from 4/21 to July 2021. \par \par I have reviewed the patient's medical records and diagnostic images at time of this office consultation and have made the following recommendation:\par 1. Recommend Left VATS, robotic assist, left upper lobe lobectomy, MLND on 9/15/2021 at [facility]. All risks vs. benefits and alternatives were explained to the patient, all questions were answered, patient verbalized understanding, was in agreement with the plan to proceed.\par 2. Medical clearance. \par 3. PST. \par \par \par I personally performed the services described in the documentation, reviewed the documentation recorded by the scribe in my presence and it accurately and completely records my words and actions.\par \par I, Roxie Brandon NP, am scribing for and the presence of SAL Hernandez, the following sections HISTORY OF PRESENT ILLNESS, PAST MEDICAL/FAMILY/SOCIAL HISTORY; REVIEW OF SYSTEMS; VITAL SIGNS; PHYSICAL EXAM; DISPOSITION.\par \par

## 2021-08-23 NOTE — HISTORY OF PRESENT ILLNESS
[FreeTextEntry1] : Ms. MARCELLO LOAIZA, 66 year old female, never smoker, w/ hx of cervical CA in 2010 s/p hysterectomy, Right Breast CA in 2013, s/p induction chemotherapy, right lumpectomy, RT in 2014, HTN, DM, who f/u with her PCP Dr. Eduar Sanchez with elevated CA 2729, a PET/CT was ordered. \par \par PET/CT on 2/26/21:\par - 7 mm medial left supraclavicular node max SUV=2.2, concerning for metastatic disease. left axillary active node with normal fatty hilum max SUV=2.6\par - spiculated 8 mm medial DEBORAH solid nodule abuts the pleural surface max SUV=2.7, inflammatory/metastatic disease is suspected\par - left suprahilar percent activity corresponding to a few prominent lymph node max SUV=8.5, highly suspicious for malignancy, possibly a new primary neoplasm.\par - a few small areas of subsegmental atelectasis and/or scarring in both lungs, particularly the right lung apex.\par \par Of note, patient is s/p 2 dose of COVID-19 vaccination. 1st dose on 02/05/2021 and 2nd dose on 03/05/2021, both was given on her left arm. \par \par CT Chest w/ contrast on 3/13/21:\par - radiation fibrosis involving the RUL\par - stable 8mm subpleural nodule in the DEBORAH (3:33)\par - 1cm Lt hilar LN (2:42)\par \par Brain MRI on 3/13/21: MARU.\par \par PFTs on 3/15/21: FVC 94%, FEV1 96%, DLCO 99%.\par \par Now s/p Flex Bronch bx and brushing of DEBORAH endobronchial lesion, FB w/ BAL of DEBORAH bronchus, EBUS bx of Lt hilar LNs on 3/17/21. Path of DEBORAH endobronchial bx and brushing revealed +malignancy, c/w poorly-diff NSCLC, +CAM5.2 and CK7. DEBORAH BAL +malignancy, NSCLC. Lt hilar LN negative for malignancy. \par \par U/S-guided FNA of Lt supraclavicular LN on 5/14/21, path negative for malignancy.\par \par CT Chest w/ contrast on 7/14/21:\par - post-radiation changes\par - stable 8 mm DEBORAH medial subpleural nodule (3:42)\par - interval decrease in size of Lt hilar LN, now 4 mm, previously 1cm\par \par PET/CT on 8/12/21:\par - Rt aspect of liver SUV=3.7\par - decreased in size 6 mm DEBORAH nodule (previously 8 mm with SUV=2.4)\par - stable few Lt pleural based nodules, up to 4 mm\par - improved Lt suprahilar region SUV=4.2, previously SUV=5.8\par \par She completed neoadjuvant chemo from 4/21 to July 2021. \par \par Patient is here today to discuss possible surgery: Lt VATS Robotic-assisted, DEBORAH wedge rxn, possible segmentectomy, possible lobectomy, MLND. She feels well now, denies fever, chills, cough, sob, CP, weight loss, pain.

## 2021-08-30 ENCOUNTER — OUTPATIENT (OUTPATIENT)
Dept: OUTPATIENT SERVICES | Facility: HOSPITAL | Age: 66
LOS: 1 days | End: 2021-08-30
Payer: MEDICARE

## 2021-08-30 VITALS
OXYGEN SATURATION: 98 % | HEIGHT: 61 IN | RESPIRATION RATE: 16 BRPM | TEMPERATURE: 98 F | DIASTOLIC BLOOD PRESSURE: 80 MMHG | WEIGHT: 158.07 LBS | SYSTOLIC BLOOD PRESSURE: 118 MMHG | HEART RATE: 89 BPM

## 2021-08-30 DIAGNOSIS — Z98.890 OTHER SPECIFIED POSTPROCEDURAL STATES: Chronic | ICD-10-CM

## 2021-08-30 DIAGNOSIS — Z90.710 ACQUIRED ABSENCE OF BOTH CERVIX AND UTERUS: Chronic | ICD-10-CM

## 2021-08-30 DIAGNOSIS — R91.1 SOLITARY PULMONARY NODULE: ICD-10-CM

## 2021-08-30 LAB
A1C WITH ESTIMATED AVERAGE GLUCOSE RESULT: 6.3 % — HIGH (ref 4–5.6)
ALBUMIN SERPL ELPH-MCNC: 4.7 G/DL — SIGNIFICANT CHANGE UP (ref 3.3–5)
ALP SERPL-CCNC: 62 U/L — SIGNIFICANT CHANGE UP (ref 40–120)
ALT FLD-CCNC: 20 U/L — SIGNIFICANT CHANGE UP (ref 4–33)
ANION GAP SERPL CALC-SCNC: 15 MMOL/L — HIGH (ref 7–14)
AST SERPL-CCNC: 16 U/L — SIGNIFICANT CHANGE UP (ref 4–32)
BILIRUB SERPL-MCNC: 0.5 MG/DL — SIGNIFICANT CHANGE UP (ref 0.2–1.2)
BLD GP AB SCN SERPL QL: NEGATIVE — SIGNIFICANT CHANGE UP
BUN SERPL-MCNC: 11 MG/DL — SIGNIFICANT CHANGE UP (ref 7–23)
CALCIUM SERPL-MCNC: 9.9 MG/DL — SIGNIFICANT CHANGE UP (ref 8.4–10.5)
CHLORIDE SERPL-SCNC: 100 MMOL/L — SIGNIFICANT CHANGE UP (ref 98–107)
CO2 SERPL-SCNC: 23 MMOL/L — SIGNIFICANT CHANGE UP (ref 22–31)
CREAT SERPL-MCNC: 0.5 MG/DL — SIGNIFICANT CHANGE UP (ref 0.5–1.3)
ESTIMATED AVERAGE GLUCOSE: 134 — SIGNIFICANT CHANGE UP
GLUCOSE SERPL-MCNC: 105 MG/DL — HIGH (ref 70–99)
HCT VFR BLD CALC: 34.9 % — SIGNIFICANT CHANGE UP (ref 34.5–45)
HGB BLD-MCNC: 11.8 G/DL — SIGNIFICANT CHANGE UP (ref 11.5–15.5)
MCHC RBC-ENTMCNC: 32.3 PG — SIGNIFICANT CHANGE UP (ref 27–34)
MCHC RBC-ENTMCNC: 33.8 GM/DL — SIGNIFICANT CHANGE UP (ref 32–36)
MCV RBC AUTO: 95.6 FL — SIGNIFICANT CHANGE UP (ref 80–100)
NRBC # BLD: 0 /100 WBCS — SIGNIFICANT CHANGE UP
NRBC # FLD: 0 K/UL — SIGNIFICANT CHANGE UP
PLATELET # BLD AUTO: 232 K/UL — SIGNIFICANT CHANGE UP (ref 150–400)
POTASSIUM SERPL-MCNC: 4.1 MMOL/L — SIGNIFICANT CHANGE UP (ref 3.5–5.3)
POTASSIUM SERPL-SCNC: 4.1 MMOL/L — SIGNIFICANT CHANGE UP (ref 3.5–5.3)
PROT SERPL-MCNC: 7.1 G/DL — SIGNIFICANT CHANGE UP (ref 6–8.3)
RBC # BLD: 3.65 M/UL — LOW (ref 3.8–5.2)
RBC # FLD: 11.9 % — SIGNIFICANT CHANGE UP (ref 10.3–14.5)
RH IG SCN BLD-IMP: POSITIVE — SIGNIFICANT CHANGE UP
SODIUM SERPL-SCNC: 138 MMOL/L — SIGNIFICANT CHANGE UP (ref 135–145)
WBC # BLD: 6.77 K/UL — SIGNIFICANT CHANGE UP (ref 3.8–10.5)
WBC # FLD AUTO: 6.77 K/UL — SIGNIFICANT CHANGE UP (ref 3.8–10.5)

## 2021-08-30 PROCEDURE — 93010 ELECTROCARDIOGRAM REPORT: CPT

## 2021-08-30 RX ORDER — SITAGLIPTIN AND METFORMIN HYDROCHLORIDE 500; 50 MG/1; MG/1
1 TABLET, FILM COATED ORAL
Qty: 0 | Refills: 0 | DISCHARGE

## 2021-08-30 RX ORDER — ASPIRIN/CALCIUM CARB/MAGNESIUM 324 MG
1 TABLET ORAL
Qty: 0 | Refills: 0 | DISCHARGE

## 2021-08-30 RX ORDER — SODIUM CHLORIDE 9 MG/ML
1000 INJECTION, SOLUTION INTRAVENOUS
Refills: 0 | Status: DISCONTINUED | OUTPATIENT
Start: 2021-09-15 | End: 2021-09-15

## 2021-08-30 RX ORDER — MILK THISTLE 180 MG
1 CAPSULE ORAL
Qty: 0 | Refills: 0 | DISCHARGE

## 2021-08-30 RX ORDER — LISINOPRIL/HYDROCHLOROTHIAZIDE 10-12.5 MG
1 TABLET ORAL
Qty: 0 | Refills: 0 | DISCHARGE

## 2021-08-30 NOTE — H&P PST ADULT - HISTORY OF PRESENT ILLNESS
Pt is  66 yr old female schedule for Robotic Assisted Left Video Assisted Thoracoscopy Left Upper Lobectomy Medistinal Lymph Node Dissection with Dr Lopez tentatively 9/15/21 - pt hx right  breast ca / lumpectomy 2014 with Chemo and RT , Hysterectomy    and recent   Pt denies COVID   Pt had Moderna  vaccine   Patient instructed to contact surgeon's office concerning COVID test prior to surgery  Pt is  66 yr old female schedule for Robotic Assisted Left Video Assisted Thoracoscopy Left Upper Lobectomy Medistinal Lymph Node Dissection with Dr Lopez tentatively 9/15/21 - pt hx right  breast ca / lumpectomy 2014 with Chemo and RT , Hysterectomy 2009 - pt had PET scan 8/21 and lymph node enlargement and pulmonary nodule noted - biopsy done - pt denies SOB or cough - hx HTN, HLD, DM     Pt denies COVID   Pt had Moderna  vaccine   Patient instructed to contact surgeon's office concerning COVID test prior to surgery

## 2021-08-30 NOTE — H&P PST ADULT - PROBLEM SELECTOR PLAN 1
Pt scheduled for surgery and preop instructions including instructions for taking Famotidine and for Chlorhexidine use in showering on the day of surgery, given verbally and with use of  written materials, and patient confirming understanding of such instructions using  teach back method.  REquest MC from PCP for hx breast ca and DM   Pt to take last dose of Janumet 9/13/21 pm dose   Pt to take Lisinopril am DOS

## 2021-08-30 NOTE — H&P PST ADULT - NSICDXPASTMEDICALHX_GEN_ALL_CORE_FT
PAST MEDICAL HISTORY:  Breast CA right    Cervical ca     DM (diabetes mellitus)     HLD (hyperlipidemia)     HTN (hypertension)     Pulmonary nodule     Pulmonary nodule      PAST MEDICAL HISTORY:  Breast CA right    Cervical ca     DM (diabetes mellitus)     HLD (hyperlipidemia)     HTN (hypertension)     Pulmonary nodule

## 2021-08-30 NOTE — H&P PST ADULT - SKIN/BREAST COMMENTS
Right breast ca - lumpectomy 2014 - chemo and RT - Right breast ca - lumpectomy 2014 - chemo and RT - NO IV/BP right arm

## 2021-08-30 NOTE — H&P PST ADULT - PRIMARY CARE PROVIDER
[No Edema] : there was no peripheral edema [Normal] : soft, non-tender, non-distended, no masses palpated, no HSM and normal bowel sounds Dr Chappell Dr Misti Wesley [phone]

## 2021-09-11 DIAGNOSIS — Z01.818 ENCOUNTER FOR OTHER PREPROCEDURAL EXAMINATION: ICD-10-CM

## 2021-09-12 ENCOUNTER — APPOINTMENT (OUTPATIENT)
Dept: DISASTER EMERGENCY | Facility: CLINIC | Age: 66
End: 2021-09-12

## 2021-09-13 LAB — SARS-COV-2 N GENE NPH QL NAA+PROBE: NOT DETECTED

## 2021-09-14 ENCOUNTER — TRANSCRIPTION ENCOUNTER (OUTPATIENT)
Age: 66
End: 2021-09-14

## 2021-09-14 NOTE — ASU PATIENT PROFILE, ADULT - NSICDXPASTMEDICALHX_GEN_ALL_CORE_FT
PAST MEDICAL HISTORY:  Breast CA right    Cervical ca     DM (diabetes mellitus)     HLD (hyperlipidemia)     HTN (hypertension)     Pulmonary nodule

## 2021-09-15 ENCOUNTER — APPOINTMENT (OUTPATIENT)
Dept: THORACIC SURGERY | Facility: HOSPITAL | Age: 66
End: 2021-09-15

## 2021-09-15 ENCOUNTER — RESULT REVIEW (OUTPATIENT)
Age: 66
End: 2021-09-15

## 2021-09-15 ENCOUNTER — INPATIENT (INPATIENT)
Facility: HOSPITAL | Age: 66
LOS: 0 days | Discharge: HOME | End: 2021-09-16
Attending: THORACIC SURGERY (CARDIOTHORACIC VASCULAR SURGERY) | Admitting: THORACIC SURGERY (CARDIOTHORACIC VASCULAR SURGERY)
Payer: MEDICARE

## 2021-09-15 ENCOUNTER — TRANSCRIPTION ENCOUNTER (OUTPATIENT)
Age: 66
End: 2021-09-15

## 2021-09-15 VITALS
HEIGHT: 61 IN | OXYGEN SATURATION: 96 % | DIASTOLIC BLOOD PRESSURE: 80 MMHG | SYSTOLIC BLOOD PRESSURE: 116 MMHG | HEART RATE: 82 BPM | TEMPERATURE: 98 F | RESPIRATION RATE: 16 BRPM | WEIGHT: 158.07 LBS

## 2021-09-15 DIAGNOSIS — Z90.710 ACQUIRED ABSENCE OF BOTH CERVIX AND UTERUS: Chronic | ICD-10-CM

## 2021-09-15 DIAGNOSIS — R91.1 SOLITARY PULMONARY NODULE: ICD-10-CM

## 2021-09-15 DIAGNOSIS — Z98.890 OTHER SPECIFIED POSTPROCEDURAL STATES: Chronic | ICD-10-CM

## 2021-09-15 LAB
GLUCOSE BLDC GLUCOMTR-MCNC: 146 MG/DL — HIGH (ref 70–99)
GLUCOSE BLDC GLUCOMTR-MCNC: 168 MG/DL — HIGH (ref 70–99)
GLUCOSE BLDC GLUCOMTR-MCNC: 177 MG/DL — HIGH (ref 70–99)
GLUCOSE BLDC GLUCOMTR-MCNC: 240 MG/DL — HIGH (ref 70–99)
RH IG SCN BLD-IMP: POSITIVE — SIGNIFICANT CHANGE UP

## 2021-09-15 PROCEDURE — 32609 THORACOSCOPY W/BX PLEURA: CPT | Mod: AS

## 2021-09-15 PROCEDURE — 88341 IMHCHEM/IMCYTCHM EA ADD ANTB: CPT | Mod: 26,59

## 2021-09-15 PROCEDURE — 88360 TUMOR IMMUNOHISTOCHEM/MANUAL: CPT | Mod: 26

## 2021-09-15 PROCEDURE — 88305 TISSUE EXAM BY PATHOLOGIST: CPT | Mod: 26

## 2021-09-15 PROCEDURE — 88342 IMHCHEM/IMCYTCHM 1ST ANTB: CPT | Mod: 26,59

## 2021-09-15 PROCEDURE — 88334 PATH CONSLTJ SURG CYTO XM EA: CPT | Mod: 26,59

## 2021-09-15 PROCEDURE — 71045 X-RAY EXAM CHEST 1 VIEW: CPT | Mod: 26

## 2021-09-15 PROCEDURE — 88331 PATH CONSLTJ SURG 1 BLK 1SPC: CPT | Mod: 26

## 2021-09-15 PROCEDURE — S2900 ROBOTIC SURGICAL SYSTEM: CPT | Mod: NC

## 2021-09-15 PROCEDURE — 32609 THORACOSCOPY W/BX PLEURA: CPT

## 2021-09-15 PROCEDURE — 99233 SBSQ HOSP IP/OBS HIGH 50: CPT

## 2021-09-15 RX ORDER — HYDROMORPHONE HYDROCHLORIDE 2 MG/ML
30 INJECTION INTRAMUSCULAR; INTRAVENOUS; SUBCUTANEOUS
Refills: 0 | Status: DISCONTINUED | OUTPATIENT
Start: 2021-09-15 | End: 2021-09-15

## 2021-09-15 RX ORDER — DEXTROSE 50 % IN WATER 50 %
15 SYRINGE (ML) INTRAVENOUS ONCE
Refills: 0 | Status: DISCONTINUED | OUTPATIENT
Start: 2021-09-15 | End: 2021-09-16

## 2021-09-15 RX ORDER — INSULIN LISPRO 100/ML
VIAL (ML) SUBCUTANEOUS
Refills: 0 | Status: DISCONTINUED | OUTPATIENT
Start: 2021-09-15 | End: 2021-09-16

## 2021-09-15 RX ORDER — TRAMADOL HYDROCHLORIDE 50 MG/1
50 TABLET ORAL EVERY 6 HOURS
Refills: 0 | Status: DISCONTINUED | OUTPATIENT
Start: 2021-09-15 | End: 2021-09-16

## 2021-09-15 RX ORDER — EXEMESTANE 25 MG/1
1 TABLET, SUGAR COATED ORAL
Qty: 0 | Refills: 0 | DISCHARGE

## 2021-09-15 RX ORDER — TRAMADOL HYDROCHLORIDE 50 MG/1
25 TABLET ORAL EVERY 4 HOURS
Refills: 0 | Status: DISCONTINUED | OUTPATIENT
Start: 2021-09-15 | End: 2021-09-16

## 2021-09-15 RX ORDER — HYDROMORPHONE HYDROCHLORIDE 2 MG/ML
0.5 INJECTION INTRAMUSCULAR; INTRAVENOUS; SUBCUTANEOUS
Refills: 0 | Status: DISCONTINUED | OUTPATIENT
Start: 2021-09-15 | End: 2021-09-15

## 2021-09-15 RX ORDER — GLUCAGON INJECTION, SOLUTION 0.5 MG/.1ML
1 INJECTION, SOLUTION SUBCUTANEOUS ONCE
Refills: 0 | Status: DISCONTINUED | OUTPATIENT
Start: 2021-09-15 | End: 2021-09-16

## 2021-09-15 RX ORDER — PREGABALIN 225 MG/1
1000 CAPSULE ORAL DAILY
Refills: 0 | Status: DISCONTINUED | OUTPATIENT
Start: 2021-09-15 | End: 2021-09-16

## 2021-09-15 RX ORDER — EXEMESTANE 25 MG/1
25 TABLET, SUGAR COATED ORAL EVERY 24 HOURS
Refills: 0 | Status: DISCONTINUED | OUTPATIENT
Start: 2021-09-15 | End: 2021-09-16

## 2021-09-15 RX ORDER — DEXTROSE 50 % IN WATER 50 %
12.5 SYRINGE (ML) INTRAVENOUS ONCE
Refills: 0 | Status: DISCONTINUED | OUTPATIENT
Start: 2021-09-15 | End: 2021-09-16

## 2021-09-15 RX ORDER — ACETAMINOPHEN 500 MG
975 TABLET ORAL ONCE
Refills: 0 | Status: COMPLETED | OUTPATIENT
Start: 2021-09-15 | End: 2021-09-15

## 2021-09-15 RX ORDER — ACETAMINOPHEN 500 MG
1000 TABLET ORAL ONCE
Refills: 0 | Status: COMPLETED | OUTPATIENT
Start: 2021-09-15 | End: 2021-09-15

## 2021-09-15 RX ORDER — INFLUENZA VIRUS VACCINE 15; 15; 15; 15 UG/.5ML; UG/.5ML; UG/.5ML; UG/.5ML
0.5 SUSPENSION INTRAMUSCULAR ONCE
Refills: 0 | Status: DISCONTINUED | OUTPATIENT
Start: 2021-09-15 | End: 2021-09-16

## 2021-09-15 RX ORDER — DEXTROSE 50 % IN WATER 50 %
25 SYRINGE (ML) INTRAVENOUS ONCE
Refills: 0 | Status: DISCONTINUED | OUTPATIENT
Start: 2021-09-15 | End: 2021-09-16

## 2021-09-15 RX ORDER — LISINOPRIL/HYDROCHLOROTHIAZIDE 10-12.5 MG
1 TABLET ORAL
Qty: 0 | Refills: 0 | DISCHARGE

## 2021-09-15 RX ORDER — SODIUM CHLORIDE 9 MG/ML
1000 INJECTION, SOLUTION INTRAVENOUS
Refills: 0 | Status: DISCONTINUED | OUTPATIENT
Start: 2021-09-15 | End: 2021-09-16

## 2021-09-15 RX ORDER — SITAGLIPTIN AND METFORMIN HYDROCHLORIDE 500; 50 MG/1; MG/1
1 TABLET, FILM COATED ORAL
Qty: 0 | Refills: 0 | DISCHARGE

## 2021-09-15 RX ORDER — GABAPENTIN 400 MG/1
100 CAPSULE ORAL ONCE
Refills: 0 | Status: COMPLETED | OUTPATIENT
Start: 2021-09-15 | End: 2021-09-15

## 2021-09-15 RX ORDER — DIPHENHYDRAMINE HCL 50 MG
25 CAPSULE ORAL EVERY 4 HOURS
Refills: 0 | Status: DISCONTINUED | OUTPATIENT
Start: 2021-09-15 | End: 2021-09-16

## 2021-09-15 RX ORDER — ONDANSETRON 8 MG/1
4 TABLET, FILM COATED ORAL EVERY 6 HOURS
Refills: 0 | Status: DISCONTINUED | OUTPATIENT
Start: 2021-09-15 | End: 2021-09-16

## 2021-09-15 RX ORDER — ACETAMINOPHEN 500 MG
325 TABLET ORAL EVERY 6 HOURS
Refills: 0 | Status: DISCONTINUED | OUTPATIENT
Start: 2021-09-15 | End: 2021-09-16

## 2021-09-15 RX ORDER — NALOXONE HYDROCHLORIDE 4 MG/.1ML
0.1 SPRAY NASAL
Refills: 0 | Status: DISCONTINUED | OUTPATIENT
Start: 2021-09-15 | End: 2021-09-15

## 2021-09-15 RX ORDER — HEPARIN SODIUM 5000 [USP'U]/ML
5000 INJECTION INTRAVENOUS; SUBCUTANEOUS EVERY 8 HOURS
Refills: 0 | Status: DISCONTINUED | OUTPATIENT
Start: 2021-09-15 | End: 2021-09-16

## 2021-09-15 RX ORDER — PREGABALIN 225 MG/1
1 CAPSULE ORAL
Qty: 0 | Refills: 0 | DISCHARGE

## 2021-09-15 RX ORDER — HEPARIN SODIUM 5000 [USP'U]/ML
5000 INJECTION INTRAVENOUS; SUBCUTANEOUS ONCE
Refills: 0 | Status: COMPLETED | OUTPATIENT
Start: 2021-09-15 | End: 2021-09-15

## 2021-09-15 RX ORDER — ACETAMINOPHEN 500 MG
1000 TABLET ORAL ONCE
Refills: 0 | Status: COMPLETED | OUTPATIENT
Start: 2021-09-15 | End: 2022-08-14

## 2021-09-15 RX ORDER — PYRIDOXINE HCL (VITAMIN B6) 100 MG
1 TABLET ORAL
Qty: 0 | Refills: 0 | DISCHARGE

## 2021-09-15 RX ADMIN — Medication 1000 MILLIGRAM(S): at 17:20

## 2021-09-15 RX ADMIN — PREGABALIN 1000 MICROGRAM(S): 225 CAPSULE ORAL at 18:17

## 2021-09-15 RX ADMIN — GABAPENTIN 100 MILLIGRAM(S): 400 CAPSULE ORAL at 06:40

## 2021-09-15 RX ADMIN — Medication 400 MILLIGRAM(S): at 16:50

## 2021-09-15 RX ADMIN — EXEMESTANE 25 MILLIGRAM(S): 25 TABLET, SUGAR COATED ORAL at 18:18

## 2021-09-15 RX ADMIN — SODIUM CHLORIDE 30 MILLILITER(S): 9 INJECTION, SOLUTION INTRAVENOUS at 06:39

## 2021-09-15 RX ADMIN — HEPARIN SODIUM 5000 UNIT(S): 5000 INJECTION INTRAVENOUS; SUBCUTANEOUS at 14:49

## 2021-09-15 RX ADMIN — HEPARIN SODIUM 5000 UNIT(S): 5000 INJECTION INTRAVENOUS; SUBCUTANEOUS at 21:41

## 2021-09-15 RX ADMIN — Medication 1 TABLET(S): at 18:09

## 2021-09-15 RX ADMIN — Medication 975 MILLIGRAM(S): at 06:40

## 2021-09-15 RX ADMIN — HEPARIN SODIUM 5000 UNIT(S): 5000 INJECTION INTRAVENOUS; SUBCUTANEOUS at 06:41

## 2021-09-15 RX ADMIN — Medication 2: at 18:08

## 2021-09-15 RX ADMIN — SODIUM CHLORIDE 30 MILLILITER(S): 9 INJECTION, SOLUTION INTRAVENOUS at 13:00

## 2021-09-15 NOTE — BRIEF OPERATIVE NOTE - NSICDXBRIEFPROCEDURE_GEN_ALL_CORE_FT
PROCEDURES:  Robot-assisted wedge resection of lung using video-assisted thoracoscopic surgery (VATS) using da Kelsey Xi 15-Sep-2021 09:12:24  [place]

## 2021-09-15 NOTE — DISCHARGE NOTE PROVIDER - CARE PROVIDER_API CALL
Wes Lopez (MD)  Surgery; Thoracic Surgery  [phone] [place] [address]  Phone: [phone]  Fax: [phone]  Follow Up Time:

## 2021-09-15 NOTE — BRIEF OPERATIVE NOTE - OPERATION/FINDINGS
66F never smoker w/biopsy proven NSCLC sub-centimeter DEBORAH lesion, underwent neoadjuvant chemo who is now s/p L Robotic-assisted VATS, upon entr 66F never smoker w/biopsy proven NSCLC sub-centimeter DEBORAH lesion, underwent neoadjuvant chemo who is now s/p L Robotic-assisted VATS, upon entry to the chest there were numerous pleural implants as well as implants on the diaphragm, pericardium and lung. Numerous pleural biopsies were taken and frozen section revealed they were metastatic disease so lobectomy was no pursued.

## 2021-09-15 NOTE — PROGRESS NOTE ADULT - SUBJECTIVE AND OBJECTIVE BOX
FADIA, YOUNG      66y   Female   MRN-[id number]         No Known Allergies             Daily Height in cm: 154.94 (15 Sep 2021 06:12)    Daily Drug Dosing Weight  Height (cm): 154.9 (15 Sep 2021 06:12)  Weight (kg): 71.7 (15 Sep 2021 06:12)  BMI (kg/m2): 29.9 (15 Sep 2021 06:12)  BSA (m2): 1.71 (15 Sep 2021 06:12)    HPI:  Pt is  66 yr old female schedule for Robotic Assisted Left Video Assisted Thoracoscopy Left Upper Lobectomy Medistinal Lymph Node Dissection with Dr Lopez tentatively 9/15/21 - pt hx right  breast ca / lumpectomy 2014 with Chemo and RT , Hysterectomy 2009 - pt had PET scan 8/21 and lymph node enlargement and pulmonary nodule noted - biopsy done - pt denies SOB or cough - hx HTN, HLD, DM     Pt denies COVID   Pt had Moderna  vaccine   Patient instructed to contact surgeon's office concerning COVID test prior to surgery  (30 Aug 2021 10:53)    Procedure: L Robotic-assisted VATS, upon entry to the chest there were numerous pleural implants as well as implants on the diaphragm, pericardium and lung. Numerous pleural biopsies were taken and frozen section revealed they were metastatic disease so lobectomy was no pursued 9/15/2021                       Issues:              Lung nodule              Postop pain              Chest tube in place  Hcx of Brest cancer  HTN  HLD  DM-2              Home Medications:  Calcium 600+D oral tablet: 2 tab(s) orally once a day (15 Sep 2021 06:27)  exemestane 25 mg oral tablet: 1 tab(s) orally once a day (15 Sep 2021 06:27)  Janumet 50 mg-500 mg oral tablet: 1 tab(s) orally once a day (15 Sep 2021 06:27)  lisinopril-hydrochlorothiazide 10 mg-12.5 mg oral tablet: 1 tab(s) orally once a day (15 Sep 2021 06:27)  Vitamin B12 1000 mcg oral tablet: 1 tab(s) orally once a day (15 Sep 2021 06:27)  Vitamin B6: 1 tab(s) orally once a day (15 Sep 2021 06:27)    PAST MEDICAL & SURGICAL HISTORY:  Breast CA  right    HTN (hypertension)    DM (diabetes mellitus)    Pulmonary nodule    Cervical ca    HLD (hyperlipidemia)    H/O breast surgery  right lumpectomy - NO IV/BP right arm    H/O: hysterectomy      Vital Signs Last 24 Hrs  T(C): 36.6 (15 Sep 2021 09:25), Max: 36.7 (15 Sep 2021 06:12)  T(F): 97.8 (15 Sep 2021 09:25), Max: 98 (15 Sep 2021 06:12)  HR: 68 (15 Sep 2021 11:00) (67 - 82)  BP: 123/68 (15 Sep 2021 11:00) (116/80 - 128/65)  BP(mean): 82 (15 Sep 2021 11:00) (81 - 84)  RR: 14 (15 Sep 2021 11:00) (14 - 22)  SpO2: 100% (15 Sep 2021 11:00) (96% - 100%)  I&O's Detail    15 Sep 2021 07:01  -  15 Sep 2021 11:21  --------------------------------------------------------  IN:    Lactated Ringers: 90 mL  Total IN: 90 mL    OUT:    Chest Tube (mL): 0 mL  Total OUT: 0 mL    Total NET: 90 mL        CAPILLARY BLOOD GLUCOSE        Home Medications:  Calcium 600+D oral tablet: 2 tab(s) orally once a day (15 Sep 2021 06:27)  exemestane 25 mg oral tablet: 1 tab(s) orally once a day (15 Sep 2021 06:27)  Janumet 50 mg-500 mg oral tablet: 1 tab(s) orally once a day (15 Sep 2021 06:27)  lisinopril-hydrochlorothiazide 10 mg-12.5 mg oral tablet: 1 tab(s) orally once a day (15 Sep 2021 06:27)  Vitamin B12 1000 mcg oral tablet: 1 tab(s) orally once a day (15 Sep 2021 06:27)  Vitamin B6: 1 tab(s) orally once a day (15 Sep 2021 06:27)    MEDICATIONS  (STANDING):  calcium carbonate 1250 mG  + Vitamin D (OsCal 500 + D) 1 Tablet(s) Oral daily  cyanocobalamin 1000 MICROGram(s) Oral daily  dextrose 40% Gel 15 Gram(s) Oral once  dextrose 5%. 1000 milliLiter(s) (50 mL/Hr) IV Continuous <Continuous>  dextrose 5%. 1000 milliLiter(s) (100 mL/Hr) IV Continuous <Continuous>  dextrose 50% Injectable 25 Gram(s) IV Push once  dextrose 50% Injectable 12.5 Gram(s) IV Push once  dextrose 50% Injectable 25 Gram(s) IV Push once  exemestane 25 milliGRAM(s) Oral every 24 hours  glucagon  Injectable 1 milliGRAM(s) IntraMuscular once  heparin   Injectable 5000 Unit(s) SubCutaneous every 8 hours  influenza   Vaccine 0.5 milliLiter(s) IntraMuscular once  insulin lispro (ADMELOG) corrective regimen sliding scale   SubCutaneous three times a day before meals  lactated ringers. 1000 milliLiter(s) (30 mL/Hr) IV Continuous <Continuous>    MEDICATIONS  (PRN):    Physical exam:                             General:               Pt is awake, alert,  appears to be in pain but not in  distress                                                  Neuro:                  Nonfocal                             Cardiovascular:   S1 & S2, regular / irregular                          Respiratory:         Air entry is fair and equal on both sides, has bilateral conducted sounds                           GI:                          Soft, nondistended and nontender, Bowel sounds active                            Ext:                        No cyanosis or edema     Labs:                                                                 CXR:  Left chest tube in place, minimal congestion.     Plan:  General: 66yFemale s/p L Robotic-assisted VATS, upon entry to the chest there were numerous pleural implants as well as implants on the diaphragm, pericardium and lung. Numerous pleural biopsies were taken and frozen section revealed they were metastatic disease so lobectomy was no pursued 9/15/2021 , experiencing  pain with deep breathing.                             Neuro:                                         Pain control with PCA /  Tylenol PRN                            Cardiovascular:                                          HTN: Continue hemodynamic monitoring and restart Lisinopril    HLD: Diet controlled                            Respiratory:                                         Pt is on 2L  nasal canula, wean off as tolerated                                          Comfortable, not in any distress.                                         Encourage incentive spirometry                                          Monitor chest tube output                                         Chest tube to suction,  water seal after MN                                                                 Continue bronchodilators, pulmonary toilet                            GI                                         On clear liquids, advance to DASH / diabetic diet as tolerated                                         Continue Zofran / Reglan for nausea - PRN	                                                                 Renal:                                         Continue LR  30cc/hr                                         Monitor I/Os and electrolytes                                                                                        Hem/ Onc:                                                                                  Monitor chest tube output &  signs of bleeding.                                          Follow CBC in AM                           Infectious disease:                                            Monitor for fever / leukocytosis.                                          All surgical incision / chest tube  sites look clean                            Endocrine                                             DM-2 / Hyperglycemia: Continue Accu-Checks with coverage  Hold oral meds    Pt is on SQ Heparin and Venodyne boots for DVT prophylaxis.     Pertinent clinical, laboratory, radiographic, hemodynamic, echocardiographic, respiratory data, microbiologic data and chart were reviewed and analyzed frequently throughout the course of the day and night  Patient seen, examined and plan discussed with CT Surgeon Dr. Lopez / CTICU team during rounds.    OOB to chair and ambulate as tolerated.     Status discussed with patient /  updated plan of care          Vito Aguilar MD

## 2021-09-15 NOTE — DISCHARGE NOTE PROVIDER - HOSPITAL COURSE
66 yr old female with hx right  breast ca / lumpectomy 2014 with Chemo and RT , Hysterectomy 2009 - pt had PET scan 8/21 and lymph node enlargement and pulmonary nodule noted - biopsy done.  Patient s/p flex bronch, robotic assisted left vats, pleural biopsy on 9/15/21.  Post op course without complication, patient stable for discharge home when chest tube removed.  66 yr old female with hx right  breast ca / lumpectomy 2014 with Chemo and RT , Hysterectomy 2009 - pt had PET scan 8/21 and lymph node enlargement and pulmonary nodule noted - biopsy done.  Patient s/p flex bronch, robotic assisted left vats, pleural biopsy on 9/15/21.  Post op course without complication. Today CT removed at bedside. Pt tolerated procedure well. FU CXR without significant change  Pt feels well. No CP or SOB. Ambulating ad sahra. VATS site c/d/i. Cleared for dc to home by Dr Lopez.  Vital Signs Last 24 Hrs  T(C): 37 (16 Sep 2021 08:24), Max: 37 (16 Sep 2021 08:24)  T(F): 98.6 (16 Sep 2021 08:24), Max: 98.6 (16 Sep 2021 08:24)  HR: 83 (16 Sep 2021 08:24) (77 - 97)  BP: 119/66 (16 Sep 2021 08:24) (92/58 - 132/70)  BP(mean): 72 (15 Sep 2021 18:00) (67 - 85)  RR: 16 (16 Sep 2021 08:24) (15 - 23)  SpO2: 97% (16 Sep 2021 08:24) (94% - 100%)

## 2021-09-15 NOTE — DISCHARGE NOTE PROVIDER - NSDCMRMEDTOKEN_GEN_ALL_CORE_FT
Calcium 600+D oral tablet: 2 tab(s) orally once a day  exemestane 25 mg oral tablet: 1 tab(s) orally once a day  Janumet 50 mg-500 mg oral tablet: 1 tab(s) orally once a day  lisinopril-hydrochlorothiazide 10 mg-12.5 mg oral tablet: 1 tab(s) orally once a day  Vitamin B12 1000 mcg oral tablet: 1 tab(s) orally once a day  Vitamin B6: 1 tab(s) orally once a day   acetaminophen 325 mg oral tablet: 1 tab(s) orally every 6 hours, As needed, Mild Pain (1 - 3)  Calcium 600+D oral tablet: 2 tab(s) orally once a day  Colace 100 mg oral capsule: 1 cap(s) orally 2 times a day  exemestane 25 mg oral tablet: 1 tab(s) orally once a day  Janumet 50 mg-500 mg oral tablet: 1 tab(s) orally once a day  lisinopril-hydrochlorothiazide 10 mg-12.5 mg oral tablet: 1 tab(s) orally once a day  senna oral tablet: 2 tab(s) orally once a day  traMADol 50 mg oral tablet: 1 tab(s) orally every 4 hours, As Needed -moderate to Severe Pain (7 - 10) MDD:6  Vitamin B12 1000 mcg oral tablet: 1 tab(s) orally once a day  Vitamin B6: 1 tab(s) orally once a day

## 2021-09-15 NOTE — ASU PREOP CHECKLIST - SELECT TESTS ORDERED
BMP/CBC/Type and Cross/Type and Screen/POCT Blood Glucose/COVID-19 168/BMP/CBC/Type and Cross/Type and Screen/POCT Blood Glucose/COVID-19

## 2021-09-15 NOTE — DISCHARGE NOTE PROVIDER - NSDCFUADDAPPT_GEN_ALL_CORE_FT
Follow up with Dr. Lopez in 1-2 weeks [phone]   Chest x-ray 1-2 days prior to appointment with Dr. Lopez    Follow up with primary care provider in one week   Follow up with Dr. Lopez in 2 weeks [phone] Call to make an apt.   Chest x-ray 1-2 days prior to appointment with Dr. Lopez    Follow up with primary care provider in 2-3 weeks.

## 2021-09-15 NOTE — DISCHARGE NOTE PROVIDER - NSDCACTIVITY_GEN_ALL_CORE
Return to Work/School allowed/Do not drive or operate machinery/Showering allowed/Do not make important decisions/Stairs allowed/Walking - Indoors allowed/No heavy lifting/straining/Walking - Outdoors allowed/Follow Instructions Provided by your Surgical Team Sex allowed/Do not drive or operate machinery/Showering allowed/Do not make important decisions/Stairs allowed/Walking - Indoors allowed/No heavy lifting/straining/Walking - Outdoors allowed/Follow Instructions Provided by your Surgical Team

## 2021-09-15 NOTE — PATIENT PROFILE ADULT - NSPROPOAURINARYCATHETER_GEN_A_NUR
no resolved   EKG showed sinus bradycardia without ST/T wave changes, Lipid panel -chol 243,  (elevated)-->consider statin initiation as OP  per cardiology Dr. Rosario - pt had a recent Echo and Stress test in the office which was negative  serial troponin negative, d-dimer 170   no further cardiac work up indicated at this time

## 2021-09-15 NOTE — PATIENT PROFILE ADULT - NSASFALLATTEMPTOOB_GEN_A_NUR
Discharge teaching complete. Parents verbalized understanding, questions encouraged. Treynor Stable and discharged to home per MD order. Treyn[place] Left unit via car seat with family ,  in carseat. [place] escorted off unit by [facility] staff and placed in a rear facing car seat by father. To home via private automobile. no

## 2021-09-15 NOTE — BRIEF OPERATIVE NOTE - COMMENTS
I, Gurpreet Gomez PA-C provided direct first assist support to the surgeon during this surgical procedure. My involvement included positioning, prepping and draping the patient prior to surgery, robotic port placement, robotic docking, robotic instrument insertion and removal, ensuring clear visibility and exposure for the surgeon by using instruments such as retractors, suction and sponges, stapling tissues and vessels, retrieving specimens from the operative field, closing surgical incisions, undocking the robot and dressing wounds.  As well as other tasks as directed by the surgeon.

## 2021-09-15 NOTE — DISCHARGE NOTE PROVIDER - NSDCFUADDINST_GEN_ALL_CORE_FT
Please walk 4-5 x per day; increase as tolerated. You may climb stairs. Continue to use the incentive spirometer.   You may keep wounds uncovered. Please shower daily with soap and water. The suture will be removed in the office at the follow up appointment.   Please call the office at [phone] if you have fevers, chills, worsening shortness of breath, chest pain, warmth, redness or purulent discharge from the wound.  Please walk 4-5 x per day; increase as tolerated. You may climb stairs. Continue to use the incentive spirometer.   Tomorrow your may remove all left chest dressings and then leave wounds uncovered. You may then begin to shower. Please shower daily with soap and water. The suture will be removed in the office at the follow up appointment.   Please call the office at [phone] if you have fevers, chills, worsening shortness of breath, chest pain, warmth, redness or purulent discharge from the wound.

## 2021-09-15 NOTE — DISCHARGE NOTE PROVIDER - NSDCCPCAREPLAN_GEN_ALL_CORE_FT
PRINCIPAL DISCHARGE DIAGNOSIS  Diagnosis: Pulmonary nodule  Assessment and Plan of Treatment:

## 2021-09-15 NOTE — DISCHARGE NOTE PROVIDER - NSDCCPTREATMENT_GEN_ALL_CORE_FT
PRINCIPAL PROCEDURE  Procedure: Robot-assisted wedge resection of lung using video-assisted thoracoscopic surgery (VATS) using da Kelsey Xi  Findings and Treatment:       SECONDARY PROCEDURE  Procedure: Robot-assisted wedge resection of lung using video-assisted thoracoscopic surgery (VATS) using da Kelsey Xi  Findings and Treatment:

## 2021-09-16 ENCOUNTER — TRANSCRIPTION ENCOUNTER (OUTPATIENT)
Age: 66
End: 2021-09-16

## 2021-09-16 VITALS
HEART RATE: 83 BPM | DIASTOLIC BLOOD PRESSURE: 66 MMHG | RESPIRATION RATE: 16 BRPM | TEMPERATURE: 99 F | OXYGEN SATURATION: 97 % | SYSTOLIC BLOOD PRESSURE: 119 MMHG

## 2021-09-16 LAB
ANION GAP SERPL CALC-SCNC: 13 MMOL/L — SIGNIFICANT CHANGE UP (ref 7–14)
BUN SERPL-MCNC: 13 MG/DL — SIGNIFICANT CHANGE UP (ref 7–23)
CALCIUM SERPL-MCNC: 9.7 MG/DL — SIGNIFICANT CHANGE UP (ref 8.4–10.5)
CHLORIDE SERPL-SCNC: 101 MMOL/L — SIGNIFICANT CHANGE UP (ref 98–107)
CO2 SERPL-SCNC: 26 MMOL/L — SIGNIFICANT CHANGE UP (ref 22–31)
COVID-19 SPIKE DOMAIN AB INTERP: POSITIVE
COVID-19 SPIKE DOMAIN ANTIBODY RESULT: >250 U/ML — HIGH
CREAT SERPL-MCNC: 0.51 MG/DL — SIGNIFICANT CHANGE UP (ref 0.5–1.3)
GLUCOSE BLDC GLUCOMTR-MCNC: 140 MG/DL — HIGH (ref 70–99)
GLUCOSE BLDC GLUCOMTR-MCNC: 202 MG/DL — HIGH (ref 70–99)
GLUCOSE SERPL-MCNC: 119 MG/DL — HIGH (ref 70–99)
HCT VFR BLD CALC: 33.8 % — LOW (ref 34.5–45)
HGB BLD-MCNC: 11.8 G/DL — SIGNIFICANT CHANGE UP (ref 11.5–15.5)
MAGNESIUM SERPL-MCNC: 1.8 MG/DL — SIGNIFICANT CHANGE UP (ref 1.6–2.6)
MCHC RBC-ENTMCNC: 32.6 PG — SIGNIFICANT CHANGE UP (ref 27–34)
MCHC RBC-ENTMCNC: 34.9 GM/DL — SIGNIFICANT CHANGE UP (ref 32–36)
MCV RBC AUTO: 93.4 FL — SIGNIFICANT CHANGE UP (ref 80–100)
NRBC # BLD: 0 /100 WBCS — SIGNIFICANT CHANGE UP
NRBC # FLD: 0 K/UL — SIGNIFICANT CHANGE UP
PHOSPHATE SERPL-MCNC: 4.2 MG/DL — SIGNIFICANT CHANGE UP (ref 2.5–4.5)
PLATELET # BLD AUTO: 210 K/UL — SIGNIFICANT CHANGE UP (ref 150–400)
POTASSIUM SERPL-MCNC: 3.9 MMOL/L — SIGNIFICANT CHANGE UP (ref 3.5–5.3)
POTASSIUM SERPL-SCNC: 3.9 MMOL/L — SIGNIFICANT CHANGE UP (ref 3.5–5.3)
RBC # BLD: 3.62 M/UL — LOW (ref 3.8–5.2)
RBC # FLD: 11.3 % — SIGNIFICANT CHANGE UP (ref 10.3–14.5)
SARS-COV-2 IGG+IGM SERPL QL IA: >250 U/ML — HIGH
SARS-COV-2 IGG+IGM SERPL QL IA: POSITIVE
SODIUM SERPL-SCNC: 140 MMOL/L — SIGNIFICANT CHANGE UP (ref 135–145)
WBC # BLD: 8.89 K/UL — SIGNIFICANT CHANGE UP (ref 3.8–10.5)
WBC # FLD AUTO: 8.89 K/UL — SIGNIFICANT CHANGE UP (ref 3.8–10.5)

## 2021-09-16 PROCEDURE — 71045 X-RAY EXAM CHEST 1 VIEW: CPT | Mod: 26

## 2021-09-16 RX ORDER — ACETAMINOPHEN 500 MG
1 TABLET ORAL
Qty: 0 | Refills: 0 | DISCHARGE
Start: 2021-09-16

## 2021-09-16 RX ORDER — DOCUSATE SODIUM 100 MG
1 CAPSULE ORAL
Qty: 0 | Refills: 0 | DISCHARGE

## 2021-09-16 RX ORDER — SENNA PLUS 8.6 MG/1
2 TABLET ORAL
Qty: 0 | Refills: 0 | DISCHARGE

## 2021-09-16 RX ORDER — TRAMADOL HYDROCHLORIDE 50 MG/1
1 TABLET ORAL
Qty: 30 | Refills: 0
Start: 2021-09-16 | End: 2021-09-20

## 2021-09-16 RX ADMIN — Medication 1 TABLET(S): at 12:01

## 2021-09-16 RX ADMIN — PREGABALIN 1000 MICROGRAM(S): 225 CAPSULE ORAL at 12:01

## 2021-09-16 RX ADMIN — HEPARIN SODIUM 5000 UNIT(S): 5000 INJECTION INTRAVENOUS; SUBCUTANEOUS at 05:52

## 2021-09-16 RX ADMIN — Medication 2: at 12:49

## 2021-09-16 NOTE — DISCHARGE NOTE NURSING/CASE MANAGEMENT/SOCIAL WORK - PATIENT PORTAL LINK FT
You can access the FollowMyHealth Patient Portal offered by [facility] by registering at the following website: [email or web address]. By joining [employer]’s FollowMyHealth portal, you will also be able to view your health information using other applications (apps) compatible with our system.

## 2021-09-16 NOTE — DISCHARGE NOTE NURSING/CASE MANAGEMENT/SOCIAL WORK - NSDCFUADDAPPT_GEN_ALL_CORE_FT
Follow up with Dr. Lopez in 2 weeks [phone] Call to make an apt.   Chest x-ray 1-2 days prior to appointment with Dr. Lopez    Follow up with primary care provider in 2-3 weeks.

## 2021-09-21 LAB — SURGICAL PATHOLOGY STUDY: SIGNIFICANT CHANGE UP

## 2021-09-29 PROBLEM — C34.92 NON-SMALL CELL CANCER OF LEFT LUNG: Status: ACTIVE | Noted: 2021-08-22

## 2021-09-30 ENCOUNTER — APPOINTMENT (OUTPATIENT)
Dept: THORACIC SURGERY | Facility: CLINIC | Age: 66
End: 2021-09-30
Payer: MEDICARE

## 2021-09-30 VITALS
DIASTOLIC BLOOD PRESSURE: 83 MMHG | RESPIRATION RATE: 17 BRPM | BODY MASS INDEX: 29.29 KG/M2 | TEMPERATURE: 97 F | HEART RATE: 88 BPM | SYSTOLIC BLOOD PRESSURE: 121 MMHG | WEIGHT: 155 LBS | OXYGEN SATURATION: 99 %

## 2021-09-30 DIAGNOSIS — C34.92 MALIGNANT NEOPLASM OF UNSPECIFIED PART OF LEFT BRONCHUS OR LUNG: ICD-10-CM

## 2021-09-30 PROCEDURE — 99212 OFFICE O/P EST SF 10 MIN: CPT

## 2021-12-08 NOTE — ASU PATIENT PROFILE, ADULT - ALCOHOL USE HISTORY SINGLE SELECT
Compound Hyperopic Astigmatism OU -  discussed findings w/patient-  new spectacle Rx issued-  polycarbonate required-  continue to monitor yearly or prnAmblyopia OU-  discussed findings w/patient and parent-  stable findings at this time-  patient will not patch -  continue to monitor yearly or prn; 's Notes: MR 12/8/2021D 12/8/2021 never

## 2022-01-25 NOTE — H&P PST ADULT - NECK DETAILS
Pt called back. She needs to schedule a month post op with Dr. Crow. DOS on 01/21/22. Please assist with scheduling for a one month post op as 02/25/22 schedule does not show a post op slot. If 02/25 not available, can writer offer a different date that is a little past this date?     Thank you.    supple/no JVD

## 2025-02-12 NOTE — ASU PATIENT PROFILE, ADULT - AS SC BRADEN SENSORY
February 13, 2025     Patient: Smitha Frank   YOB: 1969   Date of Visit: 2/12/2025       To Whom It May Concern:    Smitha Frank was admitted to [facility] on 2/12/2025 with life threatening condition. Our patient continues to be in our unit.   Please utilize this letter as a medical emergency excuse for Smitha Frank and please excuse any absences from the date of this patient's admission, as well as any subsequent absences while this patient remains in this hospital. Thank you.   If you have any questions or concerns, please don't hesitate to call.         Sincerely,             Vandana Martinez, APRN-CNP  [facility]  [address] [place] [address]  [phone]   (4) no impairment

## 2025-03-31 NOTE — PATIENT PROFILE ADULT - NSPROGENBLOODRESTRICT_GEN_A_NUR
Doxycycline Pregnancy And Lactation Text: This medication is Pregnancy Category D and not consider safe during pregnancy. It is also excreted in breast milk but is considered safe for shorter treatment courses. Use Enhanced Medication Counseling?: No Tetracycline Counseling: Patient counseled regarding possible photosensitivity and increased risk for sunburn.  Patient instructed to avoid sunlight, if possible.  When exposed to sunlight, patients should wear protective clothing, sunglasses, and sunscreen.  The patient was instructed to call the office immediately if the following severe adverse effects occur:  hearing changes, easy bruising/bleeding, severe headache, or vision changes.  The patient verbalized understanding of the proper use and possible adverse effects of tetracycline.  All of the patient's questions and concerns were addressed. Patient understands to avoid pregnancy while on therapy due to potential birth defects. Erythromycin Pregnancy And Lactation Text: This medication is Pregnancy Category B and is considered safe during pregnancy. It is also excreted in breast milk. Topical Clindamycin Pregnancy And Lactation Text: This medication is Pregnancy Category B and is considered safe during pregnancy. It is unknown if it is excreted in breast milk. Azithromycin Counseling:  I discussed with the patient the risks of azithromycin including but not limited to GI upset, allergic reaction, drug rash, diarrhea, and yeast infections. Aklief counseling:  Patient advised to apply a pea-sized amount only at bedtime and wait 30 minutes after washing their face before applying.  If too drying, patient may add a non-comedogenic moisturizer.  The most commonly reported side effects including irritation, redness, scaling, dryness, stinging, burning, itching, and increased risk of sunburn.  The patient verbalized understanding of the proper use and possible adverse effects of retinoids.  All of the patient's questions and concerns were addressed. Isotretinoin Pregnancy And Lactation Text: This medication is Pregnancy Category X and is considered extremely dangerous during pregnancy. It is unknown if it is excreted in breast milk. Topical Sulfur Applications Pregnancy And Lactation Text: This medication is Pregnancy Category C and has an unknown safety profile during pregnancy. It is unknown if this topical medication is excreted in breast milk. Azelaic Acid Counseling: Patient counseled that medicine may cause skin irritation and to avoid applying near the eyes.  In the event of skin irritation, the patient was advised to reduce the amount of the drug applied or use it less frequently.   The patient verbalized understanding of the proper use and possible adverse effects of azelaic acid.  All of the patient's questions and concerns were addressed. Winlevi Pregnancy And Lactation Text: This medication is considered safe during pregnancy and breastfeeding. Bactrim Counseling:  I discussed with the patient the risks of sulfa antibiotics including but not limited to GI upset, allergic reaction, drug rash, diarrhea, dizziness, photosensitivity, and yeast infections.  Rarely, more serious reactions can occur including but not limited to aplastic anemia, agranulocytosis, methemoglobinemia, blood dyscrasias, liver or kidney failure, lung infiltrates or desquamative/blistering drug rashes. High Dose Vitamin A Pregnancy And Lactation Text: High dose vitamin A therapy is contraindicated during pregnancy and breast feeding. Birth Control Pills Pregnancy And Lactation Text: This medication should be avoided if pregnant and for the first 30 days post-partum. Sarecycline Counseling: Patient advised regarding possible photosensitivity and discoloration of the teeth, skin, lips, tongue and gums.  Patient instructed to avoid sunlight, if possible.  When exposed to sunlight, patients should wear protective clothing, sunglasses, and sunscreen.  The patient was instructed to call the office immediately if the following severe adverse effects occur:  hearing changes, easy bruising/bleeding, severe headache, or vision changes.  The patient verbalized understanding of the proper use and possible adverse effects of sarecycline.  All of the patient's questions and concerns were addressed. Spironolactone Counseling: Patient advised regarding risks of diarrhea, abdominal pain, hyperkalemia, birth defects (for female patients), liver toxicity and renal toxicity. The patient may need blood work to monitor liver and kidney function and potassium levels while on therapy. The patient verbalized understanding of the proper use and possible adverse effects of spironolactone.  All of the patient's questions and concerns were addressed. Detail Level: Zone Dapsone Pregnancy And Lactation Text: This medication is Pregnancy Category C and is not considered safe during pregnancy or breast feeding. Topical Retinoid Pregnancy And Lactation Text: This medication is Pregnancy Category C. It is unknown if this medication is excreted in breast milk. Tazorac Pregnancy And Lactation Text: This medication is not safe during pregnancy. It is unknown if this medication is excreted in breast milk. Tetracycline Pregnancy And Lactation Text: This medication is Pregnancy Category D and not consider safe during pregnancy. It is also excreted in breast milk. Topical Sulfur Applications Counseling: Topical Sulfur Counseling: Patient counseled that this medication may cause skin irritation or allergic reactions.  In the event of skin irritation, the patient was advised to reduce the amount of the drug applied or use it less frequently.   The patient verbalized understanding of the proper use and possible adverse effects of topical sulfur application.  All of the patient's questions and concerns were addressed. Isotretinoin Counseling: Patient should get monthly blood tests, not donate blood, not drive at night if vision affected, not share medication, and not undergo elective surgery for 6 months after tx completed. Side effects reviewed, pt to contact office should one occur. Aklief Pregnancy And Lactation Text: It is unknown if this medication is safe to use during pregnancy.  It is unknown if this medication is excreted in breast milk.  Breastfeeding women should use the topical cream on the smallest area of the skin for the shortest time needed while breastfeeding.  Do not apply to nipple and areola. Winlevi Counseling:  I discussed with the patient the risks of topical clascoterone including but not limited to erythema, scaling, itching, and stinging. Patient voiced their understanding. High Dose Vitamin A Counseling: Side effects reviewed, pt to contact office should one occur. Bactrim Pregnancy And Lactation Text: This medication is Pregnancy Category D and is known to cause fetal risk.  It is also excreted in breast milk. Azelaic Acid Pregnancy And Lactation Text: This medication is considered safe during pregnancy and breast feeding. Minocycline Counseling: Patient advised regarding possible photosensitivity and discoloration of the teeth, skin, lips, tongue and gums.  Patient instructed to avoid sunlight, if possible.  When exposed to sunlight, patients should wear protective clothing, sunglasses, and sunscreen.  The patient was instructed to call the office immediately if the following severe adverse effects occur:  hearing changes, easy bruising/bleeding, severe headache, or vision changes.  The patient verbalized understanding of the proper use and possible adverse effects of minocycline.  All of the patient's questions and concerns were addressed. Benzoyl Peroxide Pregnancy And Lactation Text: This medication is Pregnancy Category C. It is unknown if benzoyl peroxide is excreted in breast milk. Azithromycin Pregnancy And Lactation Text: This medication is considered safe during pregnancy and is also secreted in breast milk. Birth Control Pills Counseling: Birth Control Pill Counseling: I discussed with the patient the potential side effects of OCPs including but not limited to increased risk of stroke, heart attack, thrombophlebitis, deep venous thrombosis, hepatic adenomas, breast changes, GI upset, headaches, and depression.  The patient verbalized understanding of the proper use and possible adverse effects of OCPs. All of the patient's questions and concerns were addressed. Benzoyl Peroxide Counseling: Patient counseled that medicine may cause skin irritation and bleach clothing.  In the event of skin irritation, the patient was advised to reduce the amount of the drug applied or use it less frequently.   The patient verbalized understanding of the proper use and possible adverse effects of benzoyl peroxide.  All of the patient's questions and concerns were addressed. Dapsone Counseling: I discussed with the patient the risks of dapsone including but not limited to hemolytic anemia, agranulocytosis, rashes, methemoglobinemia, kidney failure, peripheral neuropathy, headaches, GI upset, and liver toxicity.  Patients who start dapsone require monitoring including baseline LFTs and weekly CBCs for the first month, then every month thereafter.  The patient verbalized understanding of the proper use and possible adverse effects of dapsone.  All of the patient's questions and concerns were addressed. Topical Retinoid counseling:  Patient advised to apply a pea-sized amount only at bedtime and wait 30 minutes after washing their face before applying.  If too drying, patient may add a non-comedogenic moisturizer. The patient verbalized understanding of the proper use and possible adverse effects of retinoids.  All of the patient's questions and concerns were addressed. Tazorac Counseling:  Patient advised that medication is irritating and drying.  Patient may need to apply sparingly and wash off after an hour before eventually leaving it on overnight.  The patient verbalized understanding of the proper use and possible adverse effects of tazorac.  All of the patient's questions and concerns were addressed. Doxycycline Counseling:  Patient counseled regarding possible photosensitivity and increased risk for sunburn.  Patient instructed to avoid sunlight, if possible.  When exposed to sunlight, patients should wear protective clothing, sunglasses, and sunscreen.  The patient was instructed to call the office immediately if the following severe adverse effects occur:  hearing changes, easy bruising/bleeding, severe headache, or vision changes.  The patient verbalized understanding of the proper use and possible adverse effects of doxycycline.  All of the patient's questions and concerns were addressed. Spironolactone Pregnancy And Lactation Text: This medication can cause feminization of the male fetus and should be avoided during pregnancy. The active metabolite is also found in breast milk. Erythromycin Counseling:  I discussed with the patient the risks of erythromycin including but not limited to GI upset, allergic reaction, drug rash, diarrhea, increase in liver enzymes, and yeast infections. Topical Clindamycin Counseling: Patient counseled that this medication may cause skin irritation or allergic reactions.  In the event of skin irritation, the patient was advised to reduce the amount of the drug applied or use it less frequently.   The patient verbalized understanding of the proper use and possible adverse effects of clindamycin.  All of the patient's questions and concerns were addressed. Detail Level: Generalized none Detail Level: Simple